# Patient Record
Sex: FEMALE | Race: WHITE | NOT HISPANIC OR LATINO | URBAN - METROPOLITAN AREA
[De-identification: names, ages, dates, MRNs, and addresses within clinical notes are randomized per-mention and may not be internally consistent; named-entity substitution may affect disease eponyms.]

---

## 2020-03-25 ENCOUNTER — APPOINTMENT (OUTPATIENT)
Dept: GENERAL RADIOLOGY | Facility: HOSPITAL | Age: 45
End: 2020-03-25

## 2020-03-25 ENCOUNTER — HOSPITAL ENCOUNTER (INPATIENT)
Facility: HOSPITAL | Age: 45
LOS: 2 days | Discharge: HOME | End: 2020-03-27
Attending: EMERGENCY MEDICINE | Admitting: INTERNAL MEDICINE

## 2020-03-25 DIAGNOSIS — R07.2 CHEST PAIN, PRECORDIAL: ICD-10-CM

## 2020-03-25 DIAGNOSIS — I21.3 ST ELEVATION MYOCARDIAL INFARCTION (STEMI), UNSPECIFIED ARTERY (HCC): Primary | ICD-10-CM

## 2020-03-25 LAB
BASE EXCESS BLDA CALC-SCNC: 0 MMOL/L (ref -5–5)
BASOPHILS # BLD AUTO: 0.03 10*3/MM3 (ref 0–0.2)
BASOPHILS NFR BLD AUTO: 0.4 % (ref 0–1.5)
CA-I BLDA-SCNC: 1.2 MMOL/L (ref 1.2–1.32)
CHOLEST SERPL-MCNC: 233 MG/DL (ref 0–200)
CO2 BLDA-SCNC: 26 MMOL/L (ref 24–29)
DEPRECATED RDW RBC AUTO: 42.8 FL (ref 37–54)
EOSINOPHIL # BLD AUTO: 0.09 10*3/MM3 (ref 0–0.4)
EOSINOPHIL NFR BLD AUTO: 1.3 % (ref 0.3–6.2)
ERYTHROCYTE [DISTWIDTH] IN BLOOD BY AUTOMATED COUNT: 12.7 % (ref 12.3–15.4)
HBA1C MFR BLD: 5.5 % (ref 4.8–5.6)
HCO3 BLDA-SCNC: 24.4 MMOL/L (ref 22–26)
HCT VFR BLD AUTO: 37.9 % (ref 34–46.6)
HCT VFR BLDA CALC: 35 % (ref 38–51)
HDLC SERPL-MCNC: 71 MG/DL (ref 40–60)
HGB BLD-MCNC: 12.2 G/DL (ref 12–15.9)
HGB BLDA-MCNC: 11.9 G/DL (ref 12–17)
HOLD SPECIMEN: NORMAL
HOLD SPECIMEN: NORMAL
IMM GRANULOCYTES # BLD AUTO: 0.02 10*3/MM3 (ref 0–0.05)
IMM GRANULOCYTES NFR BLD AUTO: 0.3 % (ref 0–0.5)
INR PPP: 0.99 (ref 0.85–1.16)
LDLC SERPL CALC-MCNC: 146 MG/DL (ref 0–100)
LDLC/HDLC SERPL: 2.05 {RATIO}
LYMPHOCYTES # BLD AUTO: 2.43 10*3/MM3 (ref 0.7–3.1)
LYMPHOCYTES NFR BLD AUTO: 33.8 % (ref 19.6–45.3)
MAGNESIUM SERPL-MCNC: 1.9 MG/DL (ref 1.6–2.6)
MAGNESIUM SERPL-MCNC: 1.9 MG/DL (ref 1.6–2.6)
MCH RBC QN AUTO: 29.8 PG (ref 26.6–33)
MCHC RBC AUTO-ENTMCNC: 32.2 G/DL (ref 31.5–35.7)
MCV RBC AUTO: 92.4 FL (ref 79–97)
MONOCYTES # BLD AUTO: 0.54 10*3/MM3 (ref 0.1–0.9)
MONOCYTES NFR BLD AUTO: 7.5 % (ref 5–12)
NEUTROPHILS # BLD AUTO: 4.09 10*3/MM3 (ref 1.7–7)
NEUTROPHILS NFR BLD AUTO: 56.7 % (ref 42.7–76)
NRBC BLD AUTO-RTO: 0 /100 WBC (ref 0–0.2)
PCO2 BLDA: 38.9 MM HG (ref 35–45)
PH BLDA: 7.4 PH UNITS (ref 7.35–7.6)
PLATELET # BLD AUTO: 264 10*3/MM3 (ref 140–450)
PMV BLD AUTO: 9.3 FL (ref 6–12)
PO2 BLDA: 25 MMHG (ref 80–105)
POTASSIUM BLDA-SCNC: 3.4 MMOL/L (ref 3.5–4.9)
PROTHROMBIN TIME: 12.8 SECONDS (ref 11.5–14)
RBC # BLD AUTO: 4.1 10*6/MM3 (ref 3.77–5.28)
SAO2 % BLDA: 45 % (ref 95–98)
SODIUM BLDA-SCNC: 137 MMOL/L (ref 138–146)
TRIGL SERPL-MCNC: 81 MG/DL (ref 0–150)
TROPONIN T SERPL-MCNC: 0.63 NG/ML (ref 0–0.03)
TROPONIN T SERPL-MCNC: <0.01 NG/ML (ref 0–0.03)
VLDLC SERPL-MCNC: 16.2 MG/DL
WBC NRBC COR # BLD: 7.2 10*3/MM3 (ref 3.4–10.8)
WHOLE BLOOD HOLD SPECIMEN: NORMAL
WHOLE BLOOD HOLD SPECIMEN: NORMAL

## 2020-03-25 PROCEDURE — 84295 ASSAY OF SERUM SODIUM: CPT

## 2020-03-25 PROCEDURE — 84484 ASSAY OF TROPONIN QUANT: CPT | Performed by: INTERNAL MEDICINE

## 2020-03-25 PROCEDURE — 85610 PROTHROMBIN TIME: CPT | Performed by: EMERGENCY MEDICINE

## 2020-03-25 PROCEDURE — 85025 COMPLETE CBC W/AUTO DIFF WBC: CPT | Performed by: EMERGENCY MEDICINE

## 2020-03-25 PROCEDURE — 4A023N7 MEASUREMENT OF CARDIAC SAMPLING AND PRESSURE, LEFT HEART, PERCUTANEOUS APPROACH: ICD-10-PCS | Performed by: INTERNAL MEDICINE

## 2020-03-25 PROCEDURE — 82330 ASSAY OF CALCIUM: CPT

## 2020-03-25 PROCEDURE — 93458 L HRT ARTERY/VENTRICLE ANGIO: CPT | Performed by: INTERNAL MEDICINE

## 2020-03-25 PROCEDURE — 99024 POSTOP FOLLOW-UP VISIT: CPT | Performed by: INTERNAL MEDICINE

## 2020-03-25 PROCEDURE — 25010000002 HEPARIN (PORCINE) PER 1000 UNITS: Performed by: EMERGENCY MEDICINE

## 2020-03-25 PROCEDURE — B2111ZZ FLUOROSCOPY OF MULTIPLE CORONARY ARTERIES USING LOW OSMOLAR CONTRAST: ICD-10-PCS | Performed by: INTERNAL MEDICINE

## 2020-03-25 PROCEDURE — 0 IOPAMIDOL PER 1 ML: Performed by: INTERNAL MEDICINE

## 2020-03-25 PROCEDURE — 25010000002 EPTIFIBATIDE PER 5 MG: Performed by: INTERNAL MEDICINE

## 2020-03-25 PROCEDURE — 80061 LIPID PANEL: CPT | Performed by: INTERNAL MEDICINE

## 2020-03-25 PROCEDURE — C1894 INTRO/SHEATH, NON-LASER: HCPCS | Performed by: INTERNAL MEDICINE

## 2020-03-25 PROCEDURE — 82947 ASSAY GLUCOSE BLOOD QUANT: CPT

## 2020-03-25 PROCEDURE — 85014 HEMATOCRIT: CPT

## 2020-03-25 PROCEDURE — 83735 ASSAY OF MAGNESIUM: CPT | Performed by: INTERNAL MEDICINE

## 2020-03-25 PROCEDURE — 93005 ELECTROCARDIOGRAM TRACING: CPT | Performed by: EMERGENCY MEDICINE

## 2020-03-25 PROCEDURE — 84484 ASSAY OF TROPONIN QUANT: CPT | Performed by: EMERGENCY MEDICINE

## 2020-03-25 PROCEDURE — 25010000003 LIDOCAINE 1 % SOLUTION: Performed by: INTERNAL MEDICINE

## 2020-03-25 PROCEDURE — 84132 ASSAY OF SERUM POTASSIUM: CPT

## 2020-03-25 PROCEDURE — 82803 BLOOD GASES ANY COMBINATION: CPT

## 2020-03-25 PROCEDURE — 83735 ASSAY OF MAGNESIUM: CPT | Performed by: EMERGENCY MEDICINE

## 2020-03-25 PROCEDURE — C1887 CATHETER, GUIDING: HCPCS | Performed by: INTERNAL MEDICINE

## 2020-03-25 PROCEDURE — 71045 X-RAY EXAM CHEST 1 VIEW: CPT

## 2020-03-25 PROCEDURE — 99283 EMERGENCY DEPT VISIT LOW MDM: CPT

## 2020-03-25 PROCEDURE — B2151ZZ FLUOROSCOPY OF LEFT HEART USING LOW OSMOLAR CONTRAST: ICD-10-PCS | Performed by: INTERNAL MEDICINE

## 2020-03-25 PROCEDURE — 83036 HEMOGLOBIN GLYCOSYLATED A1C: CPT | Performed by: INTERNAL MEDICINE

## 2020-03-25 PROCEDURE — 25010000002 EPTIFIBATIDE 20 MG/10ML SOLUTION: Performed by: INTERNAL MEDICINE

## 2020-03-25 PROCEDURE — 85347 COAGULATION TIME ACTIVATED: CPT

## 2020-03-25 PROCEDURE — C1769 GUIDE WIRE: HCPCS | Performed by: INTERNAL MEDICINE

## 2020-03-25 PROCEDURE — C1760 CLOSURE DEV, VASC: HCPCS | Performed by: INTERNAL MEDICINE

## 2020-03-25 RX ORDER — SODIUM CHLORIDE 9 MG/ML
10 INJECTION INTRAVENOUS AS NEEDED
Status: DISCONTINUED | OUTPATIENT
Start: 2020-03-25 | End: 2020-03-27 | Stop reason: HOSPADM

## 2020-03-25 RX ORDER — CLOPIDOGREL BISULFATE 75 MG/1
TABLET ORAL
Status: COMPLETED | OUTPATIENT
Start: 2020-03-25 | End: 2020-03-25

## 2020-03-25 RX ORDER — NICARDIPINE HCL-0.9% SOD CHLOR 1 MG/10 ML
SYRINGE (ML) INTRAVENOUS AS NEEDED
Status: DISCONTINUED | OUTPATIENT
Start: 2020-03-25 | End: 2020-03-25 | Stop reason: HOSPADM

## 2020-03-25 RX ORDER — ALUMINA, MAGNESIA, AND SIMETHICONE 2400; 2400; 240 MG/30ML; MG/30ML; MG/30ML
15 SUSPENSION ORAL EVERY 6 HOURS PRN
Status: DISCONTINUED | OUTPATIENT
Start: 2020-03-25 | End: 2020-03-27 | Stop reason: HOSPADM

## 2020-03-25 RX ORDER — SODIUM CHLORIDE 9 MG/ML
100 INJECTION, SOLUTION INTRAVENOUS CONTINUOUS
Status: ACTIVE | OUTPATIENT
Start: 2020-03-25 | End: 2020-03-26

## 2020-03-25 RX ORDER — NITROGLYCERIN 10 MG/100ML
INJECTION INTRAVENOUS CONTINUOUS PRN
Status: COMPLETED | OUTPATIENT
Start: 2020-03-25 | End: 2020-03-25

## 2020-03-25 RX ORDER — BUSPIRONE HYDROCHLORIDE 10 MG/1
30 TABLET ORAL EVERY 12 HOURS SCHEDULED
Status: DISCONTINUED | OUTPATIENT
Start: 2020-03-25 | End: 2020-03-27 | Stop reason: HOSPADM

## 2020-03-25 RX ORDER — ASPIRIN 81 MG/1
81 TABLET, CHEWABLE ORAL DAILY
Status: DISCONTINUED | OUTPATIENT
Start: 2020-03-26 | End: 2020-03-27 | Stop reason: HOSPADM

## 2020-03-25 RX ORDER — LISINOPRIL 10 MG/1
10 TABLET ORAL DAILY
Status: DISCONTINUED | OUTPATIENT
Start: 2020-03-26 | End: 2020-03-27 | Stop reason: HOSPADM

## 2020-03-25 RX ORDER — TRAZODONE HYDROCHLORIDE 50 MG/1
50 TABLET ORAL NIGHTLY
Status: DISCONTINUED | OUTPATIENT
Start: 2020-03-25 | End: 2020-03-27 | Stop reason: HOSPADM

## 2020-03-25 RX ORDER — ONDANSETRON 4 MG/1
4 TABLET, FILM COATED ORAL EVERY 6 HOURS PRN
Status: DISCONTINUED | OUTPATIENT
Start: 2020-03-25 | End: 2020-03-27 | Stop reason: HOSPADM

## 2020-03-25 RX ORDER — CLOPIDOGREL BISULFATE 75 MG/1
75 TABLET ORAL DAILY
Status: DISCONTINUED | OUTPATIENT
Start: 2020-03-26 | End: 2020-03-27 | Stop reason: HOSPADM

## 2020-03-25 RX ORDER — ACETAMINOPHEN 325 MG/1
650 TABLET ORAL EVERY 4 HOURS PRN
Status: DISCONTINUED | OUTPATIENT
Start: 2020-03-25 | End: 2020-03-27 | Stop reason: HOSPADM

## 2020-03-25 RX ORDER — NITROGLYCERIN 20 MG/100ML
INJECTION INTRAVENOUS
Status: COMPLETED
Start: 2020-03-25 | End: 2020-03-25

## 2020-03-25 RX ORDER — PANTOPRAZOLE SODIUM 40 MG/1
40 TABLET, DELAYED RELEASE ORAL NIGHTLY
Status: DISCONTINUED | OUTPATIENT
Start: 2020-03-25 | End: 2020-03-27 | Stop reason: HOSPADM

## 2020-03-25 RX ORDER — SERTRALINE HYDROCHLORIDE 100 MG/1
200 TABLET, FILM COATED ORAL NIGHTLY
Status: DISCONTINUED | OUTPATIENT
Start: 2020-03-25 | End: 2020-03-27 | Stop reason: HOSPADM

## 2020-03-25 RX ORDER — EPTIFIBATIDE 0.75 MG/ML
2 INJECTION, SOLUTION INTRAVENOUS ONCE
Status: COMPLETED | OUTPATIENT
Start: 2020-03-25 | End: 2020-03-26

## 2020-03-25 RX ORDER — LIDOCAINE HYDROCHLORIDE 10 MG/ML
INJECTION, SOLUTION INFILTRATION; PERINEURAL AS NEEDED
Status: DISCONTINUED | OUTPATIENT
Start: 2020-03-25 | End: 2020-03-25 | Stop reason: HOSPADM

## 2020-03-25 RX ORDER — ONDANSETRON 2 MG/ML
4 INJECTION INTRAMUSCULAR; INTRAVENOUS EVERY 6 HOURS PRN
Status: DISCONTINUED | OUTPATIENT
Start: 2020-03-25 | End: 2020-03-27 | Stop reason: HOSPADM

## 2020-03-25 RX ORDER — HEPARIN SODIUM 5000 [USP'U]/ML
INJECTION, SOLUTION INTRAVENOUS; SUBCUTANEOUS
Status: COMPLETED | OUTPATIENT
Start: 2020-03-25 | End: 2020-03-25

## 2020-03-25 RX ORDER — ATORVASTATIN CALCIUM 40 MG/1
40 TABLET, FILM COATED ORAL NIGHTLY
Status: DISCONTINUED | OUTPATIENT
Start: 2020-03-25 | End: 2020-03-27 | Stop reason: HOSPADM

## 2020-03-25 RX ORDER — EPTIFIBATIDE 0.75 MG/ML
INJECTION, SOLUTION INTRAVENOUS CONTINUOUS PRN
Status: COMPLETED | OUTPATIENT
Start: 2020-03-25 | End: 2020-03-25

## 2020-03-25 RX ORDER — EPTIFIBATIDE 2 MG/ML
INJECTION, SOLUTION INTRAVENOUS AS NEEDED
Status: DISCONTINUED | OUTPATIENT
Start: 2020-03-25 | End: 2020-03-25 | Stop reason: HOSPADM

## 2020-03-25 RX ORDER — NITROGLYCERIN 20 MG/100ML
5-200 INJECTION INTRAVENOUS
Status: DISCONTINUED | OUTPATIENT
Start: 2020-03-25 | End: 2020-03-27

## 2020-03-25 RX ADMIN — NITROGLYCERIN 20 MCG/MIN: 20 INJECTION INTRAVENOUS at 20:06

## 2020-03-25 RX ADMIN — NITROGLYCERIN 10 MCG/MIN: 20 INJECTION INTRAVENOUS at 22:49

## 2020-03-25 RX ADMIN — SERTRALINE HYDROCHLORIDE 200 MG: 100 TABLET ORAL at 22:49

## 2020-03-25 RX ADMIN — HEPARIN SODIUM 5552.04 UNITS: 5000 INJECTION, SOLUTION INTRAVENOUS; SUBCUTANEOUS at 20:02

## 2020-03-25 RX ADMIN — BUSPIRONE HYDROCHLORIDE 30 MG: 10 TABLET ORAL at 22:49

## 2020-03-25 RX ADMIN — SODIUM CHLORIDE 100 ML/HR: 9 INJECTION, SOLUTION INTRAVENOUS at 22:49

## 2020-03-25 RX ADMIN — PANTOPRAZOLE SODIUM 40 MG: 40 TABLET, DELAYED RELEASE ORAL at 22:49

## 2020-03-25 RX ADMIN — METOPROLOL TARTRATE 25 MG: 25 TABLET, FILM COATED ORAL at 22:49

## 2020-03-25 RX ADMIN — ATORVASTATIN CALCIUM 40 MG: 40 TABLET, FILM COATED ORAL at 22:49

## 2020-03-25 RX ADMIN — TRAZODONE HYDROCHLORIDE 50 MG: 50 TABLET ORAL at 22:49

## 2020-03-25 RX ADMIN — CLOPIDOGREL BISULFATE 600 MG: 75 TABLET ORAL at 20:02

## 2020-03-25 RX ADMIN — EPTIFIBATIDE 2 MCG/KG/MIN: 75 INJECTION INTRAVENOUS at 22:49

## 2020-03-25 NOTE — ED PROVIDER NOTES
Subjective   Karen Wagner is a 45 y.o.female who presents to the ED with complaints of right sided chest pain. The patient reports her pain has been ongoing since sudden onset at approximately 1800 this evening. She describes her pain as a burning, sharp pain. She has been given Aspirin and Nitroglycerin while en route to the emergency department. She also complains of shortness of breath. She has also had a mild cough that she attributes to allergies. Additionally, she has a history of asthma and hypertension. She does have significant family history of coronary artery disease. There are no other complaints at this time.       History provided by:  Patient and EMS personnel  Chest Pain   Pain location:  R chest  Pain quality: burning and sharp    Pain radiates to:  Does not radiate  Pain severity:  Moderate  Onset quality:  Sudden  Duration:  2 hours  Timing:  Constant  Progression:  Unchanged  Chronicity:  New  Relieved by:  Nitroglycerin and aspirin  Worsened by:  Nothing  Associated symptoms: cough and shortness of breath    Risk factors: hypertension    Risk factors: no diabetes mellitus, no high cholesterol, not male and no smoking        Review of Systems   Respiratory: Positive for cough and shortness of breath.    Cardiovascular: Positive for chest pain.   All other systems reviewed and are negative.      No past medical history on file.    No Known Allergies    No past surgical history on file.    No family history on file.           Objective   Physical Exam   Constitutional: She is oriented to person, place, and time. She appears well-developed and well-nourished.   HENT:   Head: Normocephalic and atraumatic.   Eyes: Conjunctivae are normal. No scleral icterus.   Neck: Normal range of motion. Neck supple.   Cardiovascular: Normal rate, regular rhythm, normal heart sounds and intact distal pulses.   No murmur heard.  Pulmonary/Chest: Effort normal and breath sounds normal. No respiratory  distress.   Abdominal: Soft. Bowel sounds are normal. There is no tenderness.   Musculoskeletal: Normal range of motion. She exhibits no edema.   Neurological: She is alert and oriented to person, place, and time.   Skin: Skin is warm and dry.   Psychiatric: Her behavior is normal. Her mood appears anxious.   Nursing note and vitals reviewed.      Critical Care  Performed by: Bud Maya MD  Authorized by: Bud Maya MD     Critical care provider statement:     Critical care time (minutes):  30    Critical care time was exclusive of:  Separately billable procedures and treating other patients    Critical care was necessary to treat or prevent imminent or life-threatening deterioration of the following conditions:  Cardiac failure    Critical care was time spent personally by me on the following activities:  Ordering and performing treatments and interventions, ordering and review of laboratory studies, ordering and review of radiographic studies, pulse oximetry, re-evaluation of patient's condition, review of old charts, development of treatment plan with patient or surrogate, discussions with consultants, evaluation of patient's response to treatment, examination of patient and obtaining history from patient or surrogate    I assumed direction of critical care for this patient from another provider in my specialty: no               ED Course  ED Course as of Mar 25 2036   Wed Mar 25, 2020   1958 Dr. Maya is reviewing the EKG ran by EMS personnel while en route to the emergency department.     [TB]   2010 I have rechecked the patient.  She is still having moderate chest discomfort.  I have increased the nitroglycerin up to 30 mics per minute now.  I spoke with Dr. Rand, cardiology interventionalists.  He is on his way.  I spoke with the patient about going to the Cath Lab and she is agreeable.    [MS]   2035 COVID-19 RISK SCREEN    Has the patient been exposed to a known COVID-19 (+) person or a  person under investigation (PUI) for COVID-19 infection?  -- No     Has the patient traveled to or are they from a Level III endemic country? --  No    Has the patient traveled to or are they from a local community endemic area?   --  Yes    Does the patient have baseline higher exposure risk such as working in healthcare field or currently residing in healthcare facility?  --  No    Helpful websites (please copy and paste in an internet browser):       https://www.cdc.gov/coronavirus/2019-ncov/travelers/map-and-travel-notices.html#travel-1       https://eSeekers/yzpbnxz03       https://www.[place].gov/coronavirus/         [TB]      ED Course User Index  [MS] Bud Maya MD  [TB] Damian Sharma     Recent Results (from the past 24 hour(s))   Troponin    Collection Time: 03/25/20  8:01 PM   Result Value Ref Range    Troponin T <0.010 0.000 - 0.030 ng/mL   Magnesium    Collection Time: 03/25/20  8:01 PM   Result Value Ref Range    Magnesium 1.9 1.6 - 2.6 mg/dL   Protime-INR    Collection Time: 03/25/20  8:01 PM   Result Value Ref Range    Protime 12.8 11.5 - 14.0 Seconds    INR 0.99 0.85 - 1.16   CBC Auto Differential    Collection Time: 03/25/20  8:01 PM   Result Value Ref Range    WBC 7.20 3.40 - 10.80 10*3/mm3    RBC 4.10 3.77 - 5.28 10*6/mm3    Hemoglobin 12.2 12.0 - 15.9 g/dL    Hematocrit 37.9 34.0 - 46.6 %    MCV 92.4 79.0 - 97.0 fL    MCH 29.8 26.6 - 33.0 pg    MCHC 32.2 31.5 - 35.7 g/dL    RDW 12.7 12.3 - 15.4 %    RDW-SD 42.8 37.0 - 54.0 fl    MPV 9.3 6.0 - 12.0 fL    Platelets 264 140 - 450 10*3/mm3    Neutrophil % 56.7 42.7 - 76.0 %    Lymphocyte % 33.8 19.6 - 45.3 %    Monocyte % 7.5 5.0 - 12.0 %    Eosinophil % 1.3 0.3 - 6.2 %    Basophil % 0.4 0.0 - 1.5 %    Immature Grans % 0.3 0.0 - 0.5 %    Neutrophils, Absolute 4.09 1.70 - 7.00 10*3/mm3    Lymphocytes, Absolute 2.43 0.70 - 3.10 10*3/mm3    Monocytes, Absolute 0.54 0.10 - 0.90 10*3/mm3    Eosinophils, Absolute 0.09 0.00 - 0.40 10*3/mm3     "Basophils, Absolute 0.03 0.00 - 0.20 10*3/mm3    Immature Grans, Absolute 0.02 0.00 - 0.05 10*3/mm3    nRBC 0.0 0.0 - 0.2 /100 WBC   POC Surgery Labs    Collection Time: 03/25/20  8:08 PM   Result Value Ref Range    Ionized Calcium 1.20 1.20 - 1.32 mmol/L    POC Potassium 3.4 (L) 3.5 - 4.9 mmol/L    Sodium 137 (L) 138 - 146 mmol/L    Total CO2 26 24 - 29 mmol/L    Hemoglobin 11.9 (L) 12.0 - 17.0 g/dL    Hematocrit 35 (L) 38 - 51 %    pCO2, Arterial 38.9 35 - 45 mm Hg    pO2, Arterial 25 (L) 80 - 105 mmHg    Base Excess 0.0000 -5 - 5 mmol/L    O2 Saturation, Arterial 45 (L) 95 - 98 %    pH, Arterial 7.40 7.35 - 7.6 pH units    HCO3, Arterial 24.4 22 - 26 mmol/L     Note: In addition to lab results from this visit, the labs listed above may include labs taken at another facility or during a different encounter within the last 24 hours. Please correlate lab times with ED admission and discharge times for further clarification of the services performed during this visit.    XR Chest 1 View   Final Result   No acute cardiopulmonary findings.      Signer Name: Kenton Ortiz MD    Signed: 3/25/2020 8:21 PM    Workstation Name: [id number]     [facility]        Vitals:    03/25/20 1959 03/25/20 2028   BP: (!) 149/101 146/90   BP Location: Right arm    Patient Position: Sitting    Pulse: 99 102   Resp: 18 20   Temp: 98.3 °F (36.8 °C)    TempSrc: Oral    SpO2: 98% 98%   Weight: 92.5 kg (204 lb)    Height: 172.7 cm (68\")      Medications   Sodium Chloride (PF) 0.9 % 10 mL ( Intravenous MAR Hold 3/25/20 2021)   nitroglycerin 50 mg/250 mL (0.2 mg/mL) infusion (30 mcg/min Intravenous Rate/Dose Change 3/25/20 2009)   lidocaine (XYLOCAINE) 1 % injection (5 mL Infiltration Given 3/25/20 2029)   nitroglycerin 100 mcg/mL in D5W syringe (200 mcg Intra-coronary Given 3/25/20 2035)   niCARdipine HCl in NaCl (CARDENE) 1-0.9 MG/10ML-% syringe solution prefilled syringe (200 mcg Intra-coronary Given 3/25/20 2036) "   clopidogrel (PLAVIX) tablet (600 mg Oral Given 3/25/20 2002)   heparin (porcine) 5000 UNIT/ML injection (5,552.04 Units Intravenous Given 3/25/20 2002)     ECG/EMG Results (last 24 hours)     ** No results found for the last 24 hours. **        ECG 12 Lead   Preliminary Result   Test Reason : stemi   Blood Pressure : **/** mmHG   Vent. Rate : 075 BPM     Atrial Rate : 075 BPM      P-R Int : 160 ms          QRS Dur : 080 ms       QT Int : 422 ms       P-R-T Axes : 074 039 -06 degrees      QTc Int : 471 ms      Normal sinus rhythm   Low voltage QRS   Cannot rule out Anterior infarct (cited on or before 25-MAR-2020)   Abnormal ECG   When compared with ECG of 25-MAR-2020 20:00, (Unconfirmed)   Serial changes of evolving Anterior infarct present      Referred By:             Confirmed By:       ECG 12 Lead                                                    MDM  Number of Diagnoses or Management Options  Chest pain, precordial:   ST elevation myocardial infarction (STEMI), unspecified artery (CMS/HCC):   Critical Care  Total time providing critical care: 30-74 minutes      Final diagnoses:   ST elevation myocardial infarction (STEMI), unspecified artery (CMS/HCC)   Chest pain, precordial       Documentation assistance provided by alverto Sharma.  Information recorded by the alverto was done at my direction and has been verified and validated by me.     Damian Sharma  03/25/20 2036       Bud Maya MD  03/27/20 0431

## 2020-03-26 ENCOUNTER — APPOINTMENT (OUTPATIENT)
Dept: CARDIOLOGY | Facility: HOSPITAL | Age: 45
End: 2020-03-26

## 2020-03-26 PROBLEM — I25.42 SPONTANEOUS DISSECTION OF CORONARY ARTERY: Status: ACTIVE | Noted: 2020-03-26

## 2020-03-26 PROBLEM — E78.2 MIXED HYPERLIPIDEMIA: Status: ACTIVE | Noted: 2020-03-26

## 2020-03-26 PROBLEM — I10 ESSENTIAL HYPERTENSION: Status: ACTIVE | Noted: 2020-03-26

## 2020-03-26 PROBLEM — K21.9 GERD (GASTROESOPHAGEAL REFLUX DISEASE): Status: ACTIVE | Noted: 2020-03-26

## 2020-03-26 PROBLEM — I25.10 CAD (CORONARY ARTERY DISEASE): Status: ACTIVE | Noted: 2020-03-26

## 2020-03-26 LAB
ACT BLD: 197 SECONDS (ref 82–152)
ANION GAP SERPL CALCULATED.3IONS-SCNC: 11 MMOL/L (ref 5–15)
BH CV ECHO MEAS - AO MAX PG (FULL): 10.1 MMHG
BH CV ECHO MEAS - AO MAX PG: 12.7 MMHG
BH CV ECHO MEAS - AO MEAN PG (FULL): 4 MMHG
BH CV ECHO MEAS - AO MEAN PG: 5 MMHG
BH CV ECHO MEAS - AO ROOT AREA (BSA CORRECTED): 1.6
BH CV ECHO MEAS - AO ROOT AREA: 9.1 CM^2
BH CV ECHO MEAS - AO ROOT DIAM: 3.4 CM
BH CV ECHO MEAS - AO V2 MAX: 178 CM/SEC
BH CV ECHO MEAS - AO V2 MEAN: 99.2 CM/SEC
BH CV ECHO MEAS - AO V2 VTI: 27.8 CM
BH CV ECHO MEAS - ASC AORTA: 3.2 CM
BH CV ECHO MEAS - AVA(I,A): 1.9 CM^2
BH CV ECHO MEAS - AVA(I,D): 1.9 CM^2
BH CV ECHO MEAS - AVA(V,A): 1.3 CM^2
BH CV ECHO MEAS - AVA(V,D): 1.3 CM^2
BH CV ECHO MEAS - BSA(HAYCOCK): 2.1 M^2
BH CV ECHO MEAS - BSA: 2.1 M^2
BH CV ECHO MEAS - BZI_BMI: 31 KILOGRAMS/M^2
BH CV ECHO MEAS - BZI_METRIC_HEIGHT: 172.7 CM
BH CV ECHO MEAS - BZI_METRIC_WEIGHT: 92.5 KG
BH CV ECHO MEAS - EDV(CUBED): 95.4 ML
BH CV ECHO MEAS - EDV(MOD-SP2): 85 ML
BH CV ECHO MEAS - EDV(MOD-SP4): 91 ML
BH CV ECHO MEAS - EDV(TEICH): 95.9 ML
BH CV ECHO MEAS - EF(CUBED): 72 %
BH CV ECHO MEAS - EF(MOD-BP): 62 %
BH CV ECHO MEAS - EF(MOD-SP2): 61.2 %
BH CV ECHO MEAS - EF(MOD-SP4): 59.3 %
BH CV ECHO MEAS - EF(TEICH): 63.8 %
BH CV ECHO MEAS - ESV(CUBED): 26.7 ML
BH CV ECHO MEAS - ESV(MOD-SP2): 33 ML
BH CV ECHO MEAS - ESV(MOD-SP4): 37 ML
BH CV ECHO MEAS - ESV(TEICH): 34.7 ML
BH CV ECHO MEAS - FS: 34.6 %
BH CV ECHO MEAS - IVS/LVPW: 0.88
BH CV ECHO MEAS - IVSD: 0.73 CM
BH CV ECHO MEAS - LA DIMENSION: 2.6 CM
BH CV ECHO MEAS - LA/AO: 0.76
BH CV ECHO MEAS - LAD MAJOR: 5.8 CM
BH CV ECHO MEAS - LAT PEAK E' VEL: 13.5 CM/SEC
BH CV ECHO MEAS - LATERAL E/E' RATIO: 6.1
BH CV ECHO MEAS - LV DIASTOLIC VOL/BSA (35-75): 44.1 ML/M^2
BH CV ECHO MEAS - LV MASS(C)D: 112 GRAMS
BH CV ECHO MEAS - LV MASS(C)DI: 54.3 GRAMS/M^2
BH CV ECHO MEAS - LV MAX PG: 2.5 MMHG
BH CV ECHO MEAS - LV MEAN PG: 1 MMHG
BH CV ECHO MEAS - LV SYSTOLIC VOL/BSA (12-30): 18 ML/M^2
BH CV ECHO MEAS - LV V1 MAX: 79.7 CM/SEC
BH CV ECHO MEAS - LV V1 MEAN: 51.8 CM/SEC
BH CV ECHO MEAS - LV V1 VTI: 18.2 CM
BH CV ECHO MEAS - LVIDD: 4.6 CM
BH CV ECHO MEAS - LVIDS: 3 CM
BH CV ECHO MEAS - LVLD AP2: 7.4 CM
BH CV ECHO MEAS - LVLD AP4: 6.8 CM
BH CV ECHO MEAS - LVLS AP2: 6.1 CM
BH CV ECHO MEAS - LVLS AP4: 6.1 CM
BH CV ECHO MEAS - LVOT AREA (M): 2.8 CM^2
BH CV ECHO MEAS - LVOT AREA: 2.8 CM^2
BH CV ECHO MEAS - LVOT DIAM: 1.9 CM
BH CV ECHO MEAS - LVPWD: 0.83 CM
BH CV ECHO MEAS - MED PEAK E' VEL: 10.1 CM/SEC
BH CV ECHO MEAS - MEDIAL E/E' RATIO: 8.2
BH CV ECHO MEAS - MV A MAX VEL: 94.8 CM/SEC
BH CV ECHO MEAS - MV DEC TIME: 0.25 SEC
BH CV ECHO MEAS - MV E MAX VEL: 82.9 CM/SEC
BH CV ECHO MEAS - MV E/A: 0.87
BH CV ECHO MEAS - PA ACC SLOPE: 500 CM/SEC^2
BH CV ECHO MEAS - PA ACC TIME: 0.16 SEC
BH CV ECHO MEAS - PA PR(ACCEL): 9.3 MMHG
BH CV ECHO MEAS - RAP SYSTOLE: 3 MMHG
BH CV ECHO MEAS - RVDD: 3.6 CM
BH CV ECHO MEAS - RVSP: 19 MMHG
BH CV ECHO MEAS - SI(AO): 122.5 ML/M^2
BH CV ECHO MEAS - SI(CUBED): 33.3 ML/M^2
BH CV ECHO MEAS - SI(LVOT): 25 ML/M^2
BH CV ECHO MEAS - SI(MOD-SP2): 25.2 ML/M^2
BH CV ECHO MEAS - SI(MOD-SP4): 26.2 ML/M^2
BH CV ECHO MEAS - SI(TEICH): 29.7 ML/M^2
BH CV ECHO MEAS - SV(AO): 252.4 ML
BH CV ECHO MEAS - SV(CUBED): 68.7 ML
BH CV ECHO MEAS - SV(LVOT): 51.6 ML
BH CV ECHO MEAS - SV(MOD-SP2): 52 ML
BH CV ECHO MEAS - SV(MOD-SP4): 54 ML
BH CV ECHO MEAS - SV(TEICH): 61.1 ML
BH CV ECHO MEAS - TR MAX PG: 16 MMHG
BH CV ECHO MEAS - TR MAX VEL: 197 CM/SEC
BH CV ECHO MEASUREMENTS AVERAGE E/E' RATIO: 7.03
BH CV VAS BP LEFT ARM: NORMAL MMHG
BH CV XLRA - RV BASE: 3.2 CM
BH CV XLRA - RV LENGTH: 2.7 CM
BH CV XLRA - TDI S': 5.3 CM/SEC
BUN BLD-MCNC: 11 MG/DL (ref 6–20)
BUN/CREAT SERPL: 18.6 (ref 7–25)
CALCIUM SPEC-SCNC: 8.1 MG/DL (ref 8.6–10.5)
CHLORIDE SERPL-SCNC: 107 MMOL/L (ref 98–107)
CO2 SERPL-SCNC: 21 MMOL/L (ref 22–29)
CREAT BLD-MCNC: 0.59 MG/DL (ref 0.57–1)
DEPRECATED RDW RBC AUTO: 43.8 FL (ref 37–54)
ERYTHROCYTE [DISTWIDTH] IN BLOOD BY AUTOMATED COUNT: 12.8 % (ref 12.3–15.4)
GFR SERPL CREATININE-BSD FRML MDRD: 110 ML/MIN/1.73
GLUCOSE BLD-MCNC: 112 MG/DL (ref 65–99)
HCT VFR BLD AUTO: 34.3 % (ref 34–46.6)
HGB BLD-MCNC: 10.8 G/DL (ref 12–15.9)
LEFT ATRIUM VOLUME INDEX: 31.5 ML/M^2
LEFT ATRIUM VOLUME: 65 ML
LV EF 2D ECHO EST: 60 %
MCH RBC QN AUTO: 29.3 PG (ref 26.6–33)
MCHC RBC AUTO-ENTMCNC: 31.5 G/DL (ref 31.5–35.7)
MCV RBC AUTO: 93 FL (ref 79–97)
PLATELET # BLD AUTO: 230 10*3/MM3 (ref 140–450)
PMV BLD AUTO: 9.6 FL (ref 6–12)
POTASSIUM BLD-SCNC: 3.8 MMOL/L (ref 3.5–5.2)
RBC # BLD AUTO: 3.69 10*6/MM3 (ref 3.77–5.28)
SODIUM BLD-SCNC: 139 MMOL/L (ref 136–145)
TROPONIN T SERPL-MCNC: 1.5 NG/ML (ref 0–0.03)
WBC NRBC COR # BLD: 6.97 10*3/MM3 (ref 3.4–10.8)

## 2020-03-26 PROCEDURE — 93010 ELECTROCARDIOGRAM REPORT: CPT | Performed by: INTERNAL MEDICINE

## 2020-03-26 PROCEDURE — 93306 TTE W/DOPPLER COMPLETE: CPT | Performed by: INTERNAL MEDICINE

## 2020-03-26 PROCEDURE — 85027 COMPLETE CBC AUTOMATED: CPT | Performed by: INTERNAL MEDICINE

## 2020-03-26 PROCEDURE — 93005 ELECTROCARDIOGRAM TRACING: CPT | Performed by: INTERNAL MEDICINE

## 2020-03-26 PROCEDURE — 84484 ASSAY OF TROPONIN QUANT: CPT | Performed by: INTERNAL MEDICINE

## 2020-03-26 PROCEDURE — 99232 SBSQ HOSP IP/OBS MODERATE 35: CPT | Performed by: INTERNAL MEDICINE

## 2020-03-26 PROCEDURE — 80048 BASIC METABOLIC PNL TOTAL CA: CPT | Performed by: INTERNAL MEDICINE

## 2020-03-26 PROCEDURE — 93306 TTE W/DOPPLER COMPLETE: CPT

## 2020-03-26 RX ORDER — FLUTICASONE PROPIONATE 50 MCG
2 SPRAY, SUSPENSION (ML) NASAL DAILY
COMMUNITY

## 2020-03-26 RX ORDER — PANTOPRAZOLE SODIUM 40 MG/1
40 TABLET, DELAYED RELEASE ORAL DAILY
COMMUNITY

## 2020-03-26 RX ORDER — METOPROLOL TARTRATE 50 MG/1
50 TABLET, FILM COATED ORAL EVERY 12 HOURS SCHEDULED
Status: DISCONTINUED | OUTPATIENT
Start: 2020-03-26 | End: 2020-03-27 | Stop reason: HOSPADM

## 2020-03-26 RX ORDER — BUSPIRONE HYDROCHLORIDE 15 MG/1
15 TABLET ORAL 2 TIMES DAILY PRN
COMMUNITY

## 2020-03-26 RX ORDER — ALBUTEROL SULFATE 90 UG/1
2 AEROSOL, METERED RESPIRATORY (INHALATION) EVERY 4 HOURS PRN
COMMUNITY

## 2020-03-26 RX ORDER — LISINOPRIL 10 MG/1
10 TABLET ORAL DAILY
COMMUNITY
End: 2020-06-12

## 2020-03-26 RX ORDER — IBUPROFEN 800 MG/1
800 TABLET ORAL 3 TIMES DAILY PRN
COMMUNITY
End: 2020-06-12

## 2020-03-26 RX ORDER — CYCLOBENZAPRINE HCL 5 MG
5 TABLET ORAL SEE ADMIN INSTRUCTIONS
COMMUNITY
End: 2020-06-15 | Stop reason: SDUPTHER

## 2020-03-26 RX ORDER — ERGOCALCIFEROL 1.25 MG/1
50000 CAPSULE ORAL WEEKLY
Status: ON HOLD | COMMUNITY
End: 2020-03-26

## 2020-03-26 RX ORDER — MONTELUKAST SODIUM 10 MG/1
10 TABLET ORAL NIGHTLY
COMMUNITY

## 2020-03-26 RX ORDER — TRAZODONE HYDROCHLORIDE 100 MG/1
100 TABLET ORAL NIGHTLY
COMMUNITY

## 2020-03-26 RX ORDER — SERTRALINE HYDROCHLORIDE 100 MG/1
100 TABLET, FILM COATED ORAL 2 TIMES DAILY
COMMUNITY

## 2020-03-26 RX ORDER — LEVOCETIRIZINE DIHYDROCHLORIDE 5 MG/1
10 TABLET, FILM COATED ORAL
COMMUNITY

## 2020-03-26 RX ORDER — CHOLECALCIFEROL (VITAMIN D3) 1250 MCG
1000 CAPSULE ORAL WEEKLY
COMMUNITY

## 2020-03-26 RX ORDER — ACETAMINOPHEN 500 MG
500 TABLET ORAL EVERY 6 HOURS PRN
COMMUNITY

## 2020-03-26 RX ADMIN — ASPIRIN 81 MG 81 MG: 81 TABLET ORAL at 09:50

## 2020-03-26 RX ADMIN — METOPROLOL TARTRATE 50 MG: 50 TABLET, FILM COATED ORAL at 09:49

## 2020-03-26 RX ADMIN — ATORVASTATIN CALCIUM 40 MG: 40 TABLET, FILM COATED ORAL at 20:31

## 2020-03-26 RX ADMIN — TRAZODONE HYDROCHLORIDE 50 MG: 50 TABLET ORAL at 20:31

## 2020-03-26 RX ADMIN — METOPROLOL TARTRATE 50 MG: 50 TABLET, FILM COATED ORAL at 20:32

## 2020-03-26 RX ADMIN — BUSPIRONE HYDROCHLORIDE 30 MG: 10 TABLET ORAL at 20:31

## 2020-03-26 RX ADMIN — ACETAMINOPHEN 650 MG: 325 TABLET, FILM COATED ORAL at 20:37

## 2020-03-26 RX ADMIN — CLOPIDOGREL BISULFATE 75 MG: 75 TABLET ORAL at 15:22

## 2020-03-26 RX ADMIN — PANTOPRAZOLE SODIUM 40 MG: 40 TABLET, DELAYED RELEASE ORAL at 20:31

## 2020-03-26 RX ADMIN — BUSPIRONE HYDROCHLORIDE 30 MG: 10 TABLET ORAL at 09:49

## 2020-03-26 RX ADMIN — SERTRALINE HYDROCHLORIDE 200 MG: 100 TABLET ORAL at 20:32

## 2020-03-26 RX ADMIN — ACETAMINOPHEN 650 MG: 325 TABLET, FILM COATED ORAL at 09:51

## 2020-03-26 RX ADMIN — LISINOPRIL 10 MG: 10 TABLET ORAL at 09:49

## 2020-03-26 NOTE — PAYOR COMM NOTE
"Amy Purcell RN  Utilization Review  P: [phone]  F: [phone]    Member ID = [id number]  Initial notification & clinicals for inpatient auth    Karen Casper (45 y.o. Female)     Date of Birth Social Security Number Address Home Phone MRN    1975  [address]  [facility] [address] [phone] [id number]    Gnosticist Marital Status          Unknown Single       Admission Date Admission Type Admitting Provider Attending Provider Department, Room/Bed    3/25/20 Emergency Adalberto Rand MD Aslam, Azhar, MD [facility] 4H, S468/1    Discharge Date Discharge Disposition Discharge Destination                       Attending Provider:  Adalberto Rand MD    Allergies:  No Known Allergies    Isolation:  None   Infection:  None   Code Status:  CPR    Ht:  172.7 cm (68\")   Wt:  92.5 kg (204 lb)    Admission Cmt:  None   Principal Problem:  None                Active Insurance as of 3/25/2020     Primary Coverage     Payor Plan Insurance Group Employer/Plan Group    [facility] [employer] [facility] [employer] [place]      Payor Plan Address Payor Plan Phone Number Payor Plan Fax Number Effective Dates    [address] [address]   2017 - None Entered    [place] [address]       Subscriber Name Subscriber Birth Date Member ID       KAREN CASPER 1975 [id number]                 Emergency Contacts      (Rel.) Home Phone Work Phone Mobile Phone    unknown,no [phone] -- --               History & Physical      Adalberto Rand MD at 20            Date of Hospital Visit: 20  Encounter Provider: Adalberto Rand MD  Place of Service: [facility]  Patient Name: Karen Wagner  :1975  Referral Provider: No ref. provider found  Primary Care Provider: No primary care provider on file.    Chief complaint/Reason for Consultation: Chest pain/ST elevation MI    History of Present Illness:  The patient is a 45-year-old female with " "history of hypertension diagnosed 3 months ago.  She also has a history of anxiety and depression.  She takes lisinopril, Protonix and some psychiatric medications.  She reports a history of some sort of cardiac arrhythmia 10 to 15 years ago and states that at that time they had to \"stop/restart my heart with medications\" and she was also treated with labetalol.  She does not recall the details.  It is not clear whether she had atrial fibrillation or SVT.  The patient was in her usual state of health and at around 615 this p.m. while she was getting ready for dinner she started experiencing retrosternal burning chest discomfort with radiation into the jaw and left arm.  She called EMS, onsite EKG was of concern for ST elevation MI, cardiac authorization lab was activated and she was brought here.  En route she was started on nitroglycerin, received Plavix and heparin in the ER.  By the time of arrival to the Cath Lab her symptoms had significantly improved but she still had mild ongoing chest discomfort.  Review of systems is negative for recent fever chills abdominal pain nausea vomiting diarrhea constipation or urinary symptoms.  No symptoms of stroke.  All other review of systems are negative.  No past medical history on file.  Hypertension and anxiety/depression.  No past surgical history on file.   sections x2.  No medications prior to admission.   Social history: Non-smoker, , has a 17-year-old and a 5-year-old son.  Family history: Noncontributing.  REVIEW OF SYSTEMS:     12 point ROS was performed and is Negative except as outlined in HPI     Objective:     Vitals:    20   BP: (!) 149/101 146/90 141/95   BP Location: Right arm     Patient Position: Sitting     Pulse: 99 102 90   Resp:    Temp: 98.3 °F (36.8 °C)     TempSrc: Oral     SpO2: 98% 98% 99%   Weight: 92.5 kg (204 lb)     Height: 172.7 cm (68\")       Body mass index is 31.02 " "kg/m².  Flowsheet Rows      First Filed Value   Admission Height  172.7 cm (68\") Documented at 03/25/2020 1959   Admission Weight  92.5 kg (204 lb) Documented at 03/25/2020 1959          Physical Exam   General: No acute distress, well-developed and well-nourished.    Skin: Skin is warm and dry. No obvious cyanosis, erythema or pallor.   HEENT: Atraumatic, normocephalic, no conjunctival pallor, no scleral icterus.   Neck: Supple, no JVD. Normal carotid upstrokes, no bruits.    Chest:No respiratory distres No chest wall tenderness. Breath sounds are normal. No wheezes,  rhonchi or rales.  Cardiovascular: Normal S1 and S2, no murmer, gallop or rub. PMI is not displaced.    Pulses:Radial and pedal pulses are 2+ and symmetric.    Abdomen: Soft, non tender, normal bowel sounds.   Musculoskeletal/Extremities:  No clubbing, cyanosis or edema. No gross deformity.   Neurological: Alert and oriented to person, place, and time, no gross focal deficits.   Psychiatric: Normal mood and affect.Speech and behavior is normal.    Lab Review:                    Results from last 7 days   Lab Units 03/25/20 2001   TROPONIN T ng/mL <0.010     Results from last 7 days   Lab Units 03/25/20 2008 03/25/20 2001   WBC 10*3/mm3  --  7.20   HEMOGLOBIN g/dL  --  12.2   HEMOGLOBIN, POC g/dL 11.9*  --    HEMATOCRIT %  --  37.9   HEMATOCRIT POC % 35*  --    PLATELETS 10*3/mm3  --  264     Results from last 7 days   Lab Units 03/25/20 2001   INR  0.99     Results from last 7 days   Lab Units 03/25/20 2001   MAGNESIUM mg/dL 1.9         @LABRCNT(bnp)@  Imaging Results (Last 24 Hours)     Procedure Component Value Units Date/Time    XR Chest 1 View [[id number]] Collected:  03/25/20 2021     Updated:  03/25/20 2024    Narrative:       CR Chest 1 Vw    INDICATION:   Chest pain beginning earlier today     COMPARISON:    None available.    FINDINGS:  Single portable AP view(s) of the chest.  The heart and mediastinal contours are normal. The lungs are " clear. No pneumothorax or pleural effusion.      Impression:       No acute cardiopulmonary findings.    Signer Name: Kenton Ortiz MD   Signed: 3/25/2020 8:21 PM   Workstation Name: [facility]    [facility]           EKG: Sinus rhythm, inferior and lateral subtle ST segment elevations with anterior ST depressions consistent with inferior lateral/posterior ST elevation MI.    Assessment:   1. Acute ST elevation MI involving the inferior, lateral and posterior walls.  2. 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  3. Her chest pain has resolved.  We treated her with intravenous Cardene and nitroglycerin with some improvement of flow.  Due to distal/apical location of this lesion and spontaneous coronary dissection this is not suitable for intervention and the risk of vessel perforation and worsening of condition is high.  She generally has tortuous vessels suggesting fibromuscular dysplasia.  4. Hypertension.  5. History of anxiety and depression.    Plan:   We will treat her with aspirin, Plavix, beta-blockers, statins and ACE inhibitor's.  Integrilin for 12 hours.  Echocardiogram tomorrow.  Medical therapy and risk factor modification as well as management of stress is the mainstay of treatment which was discussed with the patient.  Further management per clinical course.  She will be admitted to telemetry for close monitoring and observation.  We will obtain an echocardiogram tomorrow.    .Adalberto Rand MD, [facility], Psychiatric        Electronically signed by Adalberto Rand MD at 03/25/20 2055       Vital Signs (last day)     Date/Time   Temp   Temp src   Pulse   Resp   BP   Patient Position   SpO2    03/26/20 0723   98.3 (36.8)   Oral   85   18   132/96   Lying   97    03/26/20 0700   --   --   74   --   --   --   97    03/26/20 0600   --   --   70   --   --   --    95    03/26/20 0500   --   --   69   --   --   --   98    03/26/20 0400   --   --   68   --   113/72   --   98    03/26/20 0300   97.4 (36.3)   Oral   69   18   106/89   Lying   --    03/26/20 0200   --   --   73   --   113/77   --   95    03/26/20 0100   --   --   70   --   108/73   --   96    03/26/20 0000   --   --   79   --   125/83   --   94    03/25/20 2330   --   --   69   --   135/94   --   97    03/25/20 2300   --   --   72   --   123/94   --   99    03/25/20 2245   --   --   72   --   126/84   --   98    03/25/20 2230   --   --   72   --   126/84   --   100    03/25/20 2220   --   --   76   --   128/86   --   99    03/25/20 2213   98.4 (36.9)   Oral   81   18   135/94   Lying   --    03/25/20 21:54:56   --   --   86   18   129/81   --   98    03/25/20 21:49:37   --   --   85   18   126/83   --   98    03/25/20 21:21:18   --   --   70   18   127/84   --   98    03/25/20 21:04:47   --   --   73   18   129/83   --   98    03/25/20 20:42:31   --   --   90   18   141/95   --   99    03/25/20 20:28:19   --   --   102   20   146/90   --   98    03/25/20 1959   98.3 (36.8)   Oral   99   18   (!) 149/101   Sitting   98                Facility-Administered Medications as of 3/26/2020   Medication Dose Route Frequency Provider Last Rate Last Dose   • acetaminophen (TYLENOL) tablet 650 mg  650 mg Oral Q4H PRN Adalberto Rand MD       • aluminum-magnesium hydroxide-simethicone (MAALOX MAX) 400-400-40 MG/5ML suspension 15 mL  15 mL Oral Q6H PRN Adalberto Rand MD       • aspirin chewable tablet 81 mg  81 mg Oral Daily Adalberto Rand MD       • atorvastatin (LIPITOR) tablet 40 mg  40 mg Oral Nightly Adalberto Rand MD   40 mg at 03/25/20 2249   • busPIRone (BUSPAR) tablet 30 mg  30 mg Oral Q12H Adalberto Rand MD   30 mg at 03/25/20 2249   • clopidogrel (PLAVIX) tablet 75 mg  75 mg Oral Daily Adalberto Rand MD       • [COMPLETED] clopidogrel (PLAVIX) tablet   Oral Code / Trauma / Sedation Medication Bud Maya MD   600 mg  at 20   • [COMPLETED] eptifibatide (INTEGRILIN) 75 MG/100ML infusion    Continuous PRN Adalberto Rand MD 14.8 mL/hr at 20 2 mcg/kg/min at 20   • [COMPLETED] eptifibatide (INTEGRILIN) 75 MG/100ML infusion  2 mcg/kg/min Intravenous Once Adalberto Rand MD   Stopped at 20   • [COMPLETED] heparin (porcine) 5000 UNIT/ML injection   Intravenous Code / Trauma / Sedation Medication Bud Maya MD   5,552.04 Units at 20   • lisinopril (PRINIVIL,ZESTRIL) tablet 10 mg  10 mg Oral Daily Adalberto Rand MD       • magnesium hydroxide (MILK OF MAGNESIA) suspension 2400 mg/10mL 10 mL  10 mL Oral Daily PRN Adalberto Rand MD       • metoprolol tartrate (LOPRESSOR) tablet 50 mg  50 mg Oral Q12H Adalberto Rand MD       • nitroglycerin 50 mg/250 mL (0.2 mg/mL) infusion  5-200 mcg/min Intravenous Titrated Adalberto Rand MD 3 mL/hr at 20 10 mcg/min at 20   • [COMPLETED] nitroglycerin infusion 100 mcg/mL    Continuous PRN Adalberto Rand MD 6 mL/hr at 20 10 mcg/min at 20   • ondansetron (ZOFRAN) tablet 4 mg  4 mg Oral Q6H PRN Adalberto Rand MD        Or   • ondansetron (ZOFRAN) injection 4 mg  4 mg Intravenous Q6H PRN Adalberto Rand MD       • pantoprazole (PROTONIX) EC tablet 40 mg  40 mg Oral Nightly Adalberto Rand MD   40 mg at 20   • Pharmacy Consult - MTM   Does not apply Daily Julia Montiel EVON       • sertraline (ZOLOFT) tablet 200 mg  200 mg Oral Nightly Adalberto Rand MD   200 mg at 20   • Sodium Chloride (PF) 0.9 % 10 mL  10 mL Intravenous PRN Adalberto Rand MD       • [] sodium chloride 0.9 % infusion  100 mL/hr Intravenous Continuous Adalberto Rand MD   Stopped at 20 0358   • traZODone (DESYREL) tablet 50 mg  50 mg Oral Nightly Adalberto Rand MD   50 mg at 20 5382     Lab Results (all)     Procedure Component Value Units Date/Time    Basic Metabolic Panel [[id number]]  (Abnormal) Collected:  20  0429    Specimen:  Blood Updated:  03/26/20 0559     Glucose 112 mg/dL      BUN 11 mg/dL      Creatinine 0.59 mg/dL      Sodium 139 mmol/L      Potassium 3.8 mmol/L      Chloride 107 mmol/L      CO2 21.0 mmol/L      Calcium 8.1 mg/dL      eGFR Non African Amer 110 mL/min/1.73      BUN/Creatinine Ratio 18.6     Anion Gap 11.0 mmol/L     Narrative:       GFR Normal >60  Chronic Kidney Disease <60  Kidney Failure <15      Troponin [[id number]]  (Abnormal) Collected:  03/26/20 0429    Specimen:  Blood Updated:  03/26/20 0550     Troponin T 1.500 ng/mL     Narrative:       Troponin T Reference Range:  <= 0.03 ng/mL-   Negative for AMI  >0.03 ng/mL-     Abnormal for myocardial necrosis.  Clinicians would have to utilize clinical acumen, EKG, Troponin and serial changes to determine if it is an Acute Myocardial Infarction or myocardial injury due to an underlying chronic condition.       Results may be falsely decreased if patient taking Biotin.      CBC (No Diff) [[id number]]  (Abnormal) Collected:  03/26/20 0429    Specimen:  Blood Updated:  03/26/20 0515     WBC 6.97 10*3/mm3      RBC 3.69 10*6/mm3      Hemoglobin 10.8 g/dL      Hematocrit 34.3 %      MCV 93.0 fL      MCH 29.3 pg      MCHC 31.5 g/dL      RDW 12.8 %      RDW-SD 43.8 fl      MPV 9.6 fL      Platelets 230 10*3/mm3     Troponin [[id number]]  (Abnormal) Collected:  03/25/20 2245    Specimen:  Blood Updated:  03/25/20 2340     Troponin T 0.634 ng/mL     Narrative:       Troponin T Reference Range:  <= 0.03 ng/mL-   Negative for AMI  >0.03 ng/mL-     Abnormal for myocardial necrosis.  Clinicians would have to utilize clinical acumen, EKG, Troponin and serial changes to determine if it is an Acute Myocardial Infarction or myocardial injury due to an underlying chronic condition.       Results may be falsely decreased if patient taking Biotin.      Magnesium [[id number]]  (Normal) Collected:  03/25/20 2245    Specimen:  Blood Updated:  03/25/20 2322     Magnesium  1.9 mg/dL     Hemoglobin A1c [[id number]]  (Normal) Collected:  03/25/20 2001    Specimen:  Blood Updated:  03/25/20 2307     Hemoglobin A1C 5.50 %     Narrative:       Hemoglobin A1C Ranges:    Increased Risk for Diabetes  5.7% to 6.4%  Diabetes                     >= 6.5%  Diabetic Goal                < 7.0%    Lipid Panel [[id number]]  (Abnormal) Collected:  03/25/20 2001    Specimen:  Blood Updated:  03/25/20 2238     Total Cholesterol 233 mg/dL      Triglycerides 81 mg/dL      HDL Cholesterol 71 mg/dL      LDL Cholesterol  146 mg/dL      VLDL Cholesterol 16.2 mg/dL      LDL/HDL Ratio 2.05    Narrative:       Cholesterol Reference Ranges  (U.S. Department of Health and Human Services ATP III Classifications)    Desirable          <200 mg/dL  Borderline High    200-239 mg/dL  High Risk          >240 mg/dL      Triglyceride Reference Ranges  (U.S. Department of Health and Human Services ATP III Classifications)    Normal           <150 mg/dL  Borderline High  150-199 mg/dL  High             200-499 mg/dL  Very High        >500 mg/dL    HDL Reference Ranges  (U.S. Department of Health and Human Services ATP III Classifcations)    Low     <40 mg/dl (major risk factor for CHD)  High    >60 mg/dl ('negative' risk factor for CHD)        LDL Reference Ranges  (U.S. Department of Health and Human Services ATP III Classifcations)    Optimal          <100 mg/dL  Near Optimal     100-129 mg/dL  Borderline High  130-159 mg/dL  High             160-189 mg/dL  Very High        >189 mg/dL    [place] Draw [[id number]] Collected:  03/25/20 2001    Specimen:  Blood Updated:  03/25/20 2115    Narrative:       The following orders were created for panel order [place] Draw.  Procedure                               Abnormality         Status                     ---------                               -----------         ------                     Light Blue Top[[id number]]                                   Final result               Green  Top (Gel)[[id number]]                                  Final result               Lavender Top[[id number]]                                     Final result               Gold Top - SST[[id number]]                                   Final result                 Please view results for these tests on the individual orders.    Light Blue Top [[id number]] Collected:  03/25/20 2001    Specimen:  Blood Updated:  03/25/20 2115     Extra Tube hold for add-on     Comment: Auto resulted       Green Top (Gel) [[id number]] Collected:  03/25/20 2001    Specimen:  Blood Updated:  03/25/20 2115     Extra Tube Hold for add-ons.     Comment: Auto resulted.       Lavender Top [[id number]] Collected:  03/25/20 2001    Specimen:  Blood Updated:  03/25/20 2115     Extra Tube hold for add-on     Comment: Auto resulted       Gold Top - SST [[id number]] Collected:  03/25/20 2001    Specimen:  Blood Updated:  03/25/20 2115     Extra Tube Hold for add-ons.     Comment: Auto resulted.       Troponin [[id number]]  (Normal) Collected:  03/25/20 2001    Specimen:  Blood Updated:  03/25/20 2024     Troponin T <0.010 ng/mL     Narrative:       Troponin T Reference Range:  <= 0.03 ng/mL-   Negative for AMI  >0.03 ng/mL-     Abnormal for myocardial necrosis.  Clinicians would have to utilize clinical acumen, EKG, Troponin and serial changes to determine if it is an Acute Myocardial Infarction or myocardial injury due to an underlying chronic condition.       Results may be falsely decreased if patient taking Biotin.      Protime-INR [[id number]]  (Normal) Collected:  03/25/20 2001    Specimen:  Blood Updated:  03/25/20 2021     Protime 12.8 Seconds      INR 0.99    Magnesium [[id number]]  (Normal) Collected:  03/25/20 2001    Specimen:  Blood Updated:  03/25/20 2020     Magnesium 1.9 mg/dL     POC Surgery Labs [[id number]]  (Abnormal) Collected:  03/25/20 2008    Specimen:  Blood Updated:  03/25/20 2011     Ionized Calcium 1.20 mmol/L      POC Potassium 3.4  mmol/L      Sodium 137 mmol/L      Total CO2 26 mmol/L      Hemoglobin 11.9 g/dL      Hematocrit 35 %      pCO2, Arterial 38.9 mm Hg      pO2, Arterial 25 mmHg      Comment: Serial Number: [id number]:  [id number]        Base Excess 0.0000 mmol/L      O2 Saturation, Arterial 45 %      pH, Arterial 7.40 pH units      HCO3, Arterial 24.4 mmol/L     CBC & Differential [[id number]] Collected:  03/25/20 2001    Specimen:  Blood Updated:  03/25/20 2008    Narrative:       The following orders were created for panel order CBC & Differential.  Procedure                               Abnormality         Status                     ---------                               -----------         ------                     CBC Auto Differential[[id number]]        Normal              Final result                 Please view results for these tests on the individual orders.    CBC Auto Differential [[id number]]  (Normal) Collected:  03/25/20 2001    Specimen:  Blood Updated:  03/25/20 2008     WBC 7.20 10*3/mm3      RBC 4.10 10*6/mm3      Hemoglobin 12.2 g/dL      Hematocrit 37.9 %      MCV 92.4 fL      MCH 29.8 pg      MCHC 32.2 g/dL      RDW 12.7 %      RDW-SD 42.8 fl      MPV 9.3 fL      Platelets 264 10*3/mm3      Neutrophil % 56.7 %      Lymphocyte % 33.8 %      Monocyte % 7.5 %      Eosinophil % 1.3 %      Basophil % 0.4 %      Immature Grans % 0.3 %      Neutrophils, Absolute 4.09 10*3/mm3      Lymphocytes, Absolute 2.43 10*3/mm3      Monocytes, Absolute 0.54 10*3/mm3      Eosinophils, Absolute 0.09 10*3/mm3      Basophils, Absolute 0.03 10*3/mm3      Immature Grans, Absolute 0.02 10*3/mm3      nRBC 0.0 /100 WBC           Imaging Results (All)     Procedure Component Value Units Date/Time    XR Chest 1 View [[id number]] Collected:  03/25/20 2021     Updated:  03/25/20 2024    Narrative:       CR Chest 1 Vw    INDICATION:   Chest pain beginning earlier today     COMPARISON:    None available.    FINDINGS:  Single portable AP view(s)  "of the chest.  The heart and mediastinal contours are normal. The lungs are clear. No pneumothorax or pleural effusion.      Impression:       No acute cardiopulmonary findings.    Signer Name: Kenton Ortiz MD   Signed: 3/25/2020 8:21 PM   Workstation Name: [id number]    [facility]          ECG/EMG Results (all)     Procedure Component Value Units Date/Time    ECG 12 Lead [[id number]] Collected:  03/26/20 0523     Updated:  03/26/20 0659    Narrative:       Test Reason : stemi  Blood Pressure : **/** mmHG  Vent. Rate : 075 BPM     Atrial Rate : 075 BPM     P-R Int : 160 ms          QRS Dur : 080 ms      QT Int : 422 ms       P-R-T Axes : 074 039 -06 degrees     QTc Int : 471 ms    Normal sinus rhythm  Low voltage QRS  Cannot rule out Anterior infarct (cited on or before 25-MAR-2020)  Abnormal ECG  When compared with ECG of 25-MAR-2020 20:00, (Unconfirmed)  Serial changes of evolving Anterior infarct present    Referred By:             Confirmed By:     ECG 12 Lead [[id number]] Collected:  03/25/20 2000     Updated:  03/26/20 0704          Operative/Procedure Notes (all)    No notes of this type exist for this encounter.            Physician Progress Notes (all)      Adalberto Rand MD at 03/26/20 0842          [facility]  IP Progress Note      Chief Complaint: Follow-up of acute MI.    Subjective   Feels better, no current chest discomfort or shortness of breath.  Getting ready to have her echocardiogram.  Wants her allergy medications restarted.    Objective     Blood pressure 132/96, pulse 85, temperature 98.3 °F (36.8 °C), temperature source Oral, resp. rate 18, height 172.7 cm (68\"), weight 92.5 kg (204 lb), SpO2 97 %.   No intake or output data in the 24 hours ending 03/26/20 0842    Physical Exam:  General: No acute distress.   Neck: no JVD.  Chest:No respiratory distress, breath sounds are normal. No wheezes,  rhonchi or rales.  Cardiovascular: " Normal S1 and S2, no murmer, gallop or rub.    Extremities: No edema.  Right groin stable, no bleeding or hematoma.    Results Review:     I reviewed the patient's new clinical results.    Results from last 7 days   Lab Units 03/26/20  0429   WBC 10*3/mm3 6.97   HEMOGLOBIN g/dL 10.8*   HEMATOCRIT % 34.3   PLATELETS 10*3/mm3 230     Results from last 7 days   Lab Units 03/26/20  0429   SODIUM mmol/L 139   POTASSIUM mmol/L 3.8   CHLORIDE mmol/L 107   CO2 mmol/L 21.0*   BUN mg/dL 11   CREATININE mg/dL 0.59   CALCIUM mg/dL 8.1*   GLUCOSE mg/dL 112*     Results from last 7 days   Lab Units 03/26/20  0429   SODIUM mmol/L 139   POTASSIUM mmol/L 3.8   CHLORIDE mmol/L 107   CO2 mmol/L 21.0*   BUN mg/dL 11   CREATININE mg/dL 0.59   GLUCOSE mg/dL 112*   CALCIUM mg/dL 8.1*     Results from last 7 days   Lab Units 03/25/20 2001   INR  0.99     Lab Results   Component Value Date    TROPONINT 1.500 (C) 03/26/2020         Results from last 7 days   Lab Units 03/25/20 2001   CHOLESTEROL mg/dL 233*   TRIGLYCERIDES mg/dL 81   HDL CHOL mg/dL 71*   LDL CHOL mg/dL 146*           Tele: Sinus Rythym, telemetry showed AIVR.      Assessment:  1. Acute ST elevation MI due to SCAD, no significant LV dysfunction, no occlusive CAD.  2. Stable overnight.  No current chest pain.  3. AI VR, probably post MI.  4. Hypertension.  5. Previously untreated dyslipidemia.  6. Seasonal allergies.  7. Anxiety/depression.  Plan:  1. Echocardiogram today to assess LV function and valve function.  2. Increase metoprolol to 50 mg twice daily.  3. Continue aspirin, Plavix and high intensity statin as well as home dose of lisinopril.  4. Restart allergy medications.  5. Increase activities as tolerated.  6. Hopefully home tomorrow.    Adalberto Rand MD, [facility], [facility]                                      Electronically signed by Adalberto Rand MD at 03/26/20 0819       Consult Notes (all)    No notes of this type exist for this encounter.

## 2020-03-26 NOTE — H&P
"  Date of Hospital Visit: 20  Encounter Provider: Adalberto Rand MD  Place of Service: [facility]  Patient Name: Karen Wagner  :1975  Referral Provider: No ref. provider found  Primary Care Provider: No primary care provider on file.    Chief complaint/Reason for Consultation: Chest pain/ST elevation MI    History of Present Illness:  The patient is a 45-year-old female with history of hypertension diagnosed 3 months ago.  She also has a history of anxiety and depression.  She takes lisinopril, Protonix and some psychiatric medications.  She reports a history of some sort of cardiac arrhythmia 10 to 15 years ago and states that at that time they had to \"stop/restart my heart with medications\" and she was also treated with labetalol.  She does not recall the details.  It is not clear whether she had atrial fibrillation or SVT.  The patient was in her usual state of health and at around 615 this p.m. while she was getting ready for dinner she started experiencing retrosternal burning chest discomfort with radiation into the jaw and left arm.  She called EMS, onsite EKG was of concern for ST elevation MI, cardiac authorization lab was activated and she was brought here.  En route she was started on nitroglycerin, received Plavix and heparin in the ER.  By the time of arrival to the Cath Lab her symptoms had significantly improved but she still had mild ongoing chest discomfort.  Review of systems is negative for recent fever chills abdominal pain nausea vomiting diarrhea constipation or urinary symptoms.  No symptoms of stroke.  All other review of systems are negative.  No past medical history on file.  Hypertension and anxiety/depression.  No past surgical history on file.   sections x2.  No medications prior to admission.   Social history: Non-smoker, , has a 17-year-old and a 5-year-old son.  Family history: Noncontributing.  REVIEW OF SYSTEMS:     12 point ROS was " "performed and is Negative except as outlined in HPI     Objective:     Vitals:    03/25/20 1959 03/25/20 2028 03/25/20 2042   BP: (!) 149/101 146/90 141/95   BP Location: Right arm     Patient Position: Sitting     Pulse: 99 102 90   Resp: 18 20 18   Temp: 98.3 °F (36.8 °C)     TempSrc: Oral     SpO2: 98% 98% 99%   Weight: 92.5 kg (204 lb)     Height: 172.7 cm (68\")       Body mass index is 31.02 kg/m².  Flowsheet Rows      First Filed Value   Admission Height  172.7 cm (68\") Documented at 03/25/2020 1959   Admission Weight  92.5 kg (204 lb) Documented at 03/25/2020 1959          Physical Exam   General: No acute distress, well-developed and well-nourished.    Skin: Skin is warm and dry. No obvious cyanosis, erythema or pallor.   HEENT: Atraumatic, normocephalic, no conjunctival pallor, no scleral icterus.   Neck: Supple, no JVD. Normal carotid upstrokes, no bruits.    Chest:No respiratory distres No chest wall tenderness. Breath sounds are normal. No wheezes,  rhonchi or rales.  Cardiovascular: Normal S1 and S2, no murmer, gallop or rub. PMI is not displaced.    Pulses:Radial and pedal pulses are 2+ and symmetric.    Abdomen: Soft, non tender, normal bowel sounds.   Musculoskeletal/Extremities:  No clubbing, cyanosis or edema. No gross deformity.   Neurological: Alert and oriented to person, place, and time, no gross focal deficits.   Psychiatric: Normal mood and affect.Speech and behavior is normal.    Lab Review:                    Results from last 7 days   Lab Units 03/25/20 2001   TROPONIN T ng/mL <0.010     Results from last 7 days   Lab Units 03/25/20 2008 03/25/20 2001   WBC 10*3/mm3  --  7.20   HEMOGLOBIN g/dL  --  12.2   HEMOGLOBIN, POC g/dL 11.9*  --    HEMATOCRIT %  --  37.9   HEMATOCRIT POC % 35*  --    PLATELETS 10*3/mm3  --  264     Results from last 7 days   Lab Units 03/25/20 2001   INR  0.99     Results from last 7 days   Lab Units 03/25/20 2001   MAGNESIUM mg/dL 1.9       "   @LABRCNT(bnp)@  Imaging Results (Last 24 Hours)     Procedure Component Value Units Date/Time    XR Chest 1 View [[id number]] Collected:  03/25/20 2021     Updated:  03/25/20 2024    Narrative:       CR Chest 1 Vw    INDICATION:   Chest pain beginning earlier today     COMPARISON:    None available.    FINDINGS:  Single portable AP view(s) of the chest.  The heart and mediastinal contours are normal. The lungs are clear. No pneumothorax or pleural effusion.      Impression:       No acute cardiopulmonary findings.    Signer Name: Kenton Ortiz MD   Signed: 3/25/2020 8:21 PM   Workstation Name: [id number]    [facility]           EKG: Sinus rhythm, inferior and lateral subtle ST segment elevations with anterior ST depressions consistent with inferior lateral/posterior ST elevation MI.    Assessment:   1. Acute ST elevation MI involving the inferior, lateral and posterior walls.  2. 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  3. Her chest pain has resolved.  We treated her with intravenous Cardene and nitroglycerin with some improvement of flow.  Due to distal/apical location of this lesion and spontaneous coronary dissection this is not suitable for intervention and the risk of vessel perforation and worsening of condition is high.  She generally has tortuous vessels suggesting fibromuscular dysplasia.  4. Hypertension.  5. History of anxiety and depression.    Plan:   We will treat her with aspirin, Plavix, beta-blockers, statins and ACE inhibitor's.  Integrilin for 12 hours.  Echocardiogram tomorrow.  Medical therapy and risk factor modification as well as management of stress is the mainstay of treatment which was discussed with the patient.  Further management per clinical course.  She will be admitted to telemetry for close monitoring and observation.   We will obtain an echocardiogram tomorrow.    .Adalberto Rand MD, FACC, [facility]

## 2020-03-26 NOTE — PLAN OF CARE
Problem: Patient Care Overview  Goal: Plan of Care Review  Outcome: Ongoing (interventions implemented as appropriate)  Flowsheets (Taken 3/26/2020 1614)  Plan of Care Reviewed With: patient  Note:   Pt VSS. Continued on nitro gtt 10 mcg. Patient has a flat affect. RA. UWSB. Pt states she feels better today and is excited to go home. Will continue to monitor

## 2020-03-26 NOTE — PROGRESS NOTES
"[facility]  IP Progress Note      Chief Complaint: Follow-up of acute MI.    Subjective   Feels better, no current chest discomfort or shortness of breath.  Getting ready to have her echocardiogram.  Wants her allergy medications restarted.    Objective     Blood pressure 132/96, pulse 85, temperature 98.3 °F (36.8 °C), temperature source Oral, resp. rate 18, height 172.7 cm (68\"), weight 92.5 kg (204 lb), SpO2 97 %.   No intake or output data in the 24 hours ending 03/26/20 0842    Physical Exam:  General: No acute distress.   Neck: no JVD.  Chest:No respiratory distress, breath sounds are normal. No wheezes,  rhonchi or rales.  Cardiovascular: Normal S1 and S2, no murmer, gallop or rub.    Extremities: No edema.  Right groin stable, no bleeding or hematoma.    Results Review:     I reviewed the patient's new clinical results.    Results from last 7 days   Lab Units 03/26/20  0429   WBC 10*3/mm3 6.97   HEMOGLOBIN g/dL 10.8*   HEMATOCRIT % 34.3   PLATELETS 10*3/mm3 230     Results from last 7 days   Lab Units 03/26/20  0429   SODIUM mmol/L 139   POTASSIUM mmol/L 3.8   CHLORIDE mmol/L 107   CO2 mmol/L 21.0*   BUN mg/dL 11   CREATININE mg/dL 0.59   CALCIUM mg/dL 8.1*   GLUCOSE mg/dL 112*     Results from last 7 days   Lab Units 03/26/20  0429   SODIUM mmol/L 139   POTASSIUM mmol/L 3.8   CHLORIDE mmol/L 107   CO2 mmol/L 21.0*   BUN mg/dL 11   CREATININE mg/dL 0.59   GLUCOSE mg/dL 112*   CALCIUM mg/dL 8.1*     Results from last 7 days   Lab Units 03/25/20 2001   INR  0.99     Lab Results   Component Value Date    TROPONINT 1.500 (C) 03/26/2020         Results from last 7 days   Lab Units 03/25/20 2001   CHOLESTEROL mg/dL 233*   TRIGLYCERIDES mg/dL 81   HDL CHOL mg/dL 71*   LDL CHOL mg/dL 146*           Tele: Sinus Rythym, telemetry showed AIVR.      Assessment:  1. Acute ST elevation MI due to SCAD, no significant LV dysfunction, no occlusive CAD.  2. Stable overnight.  No current " chest pain.  3. AI VR, probably post MI.  4. Hypertension.  5. Previously untreated dyslipidemia.  6. Seasonal allergies.  7. Anxiety/depression.  Plan:  1. Echocardiogram today to assess LV function and valve function.  2. Increase metoprolol to 50 mg twice daily.  3. Continue aspirin, Plavix and high intensity statin as well as home dose of lisinopril.  4. Restart allergy medications.  5. Increase activities as tolerated.  6. Hopefully home tomorrow.    Adalberto Rand MD, FACC, [facility]

## 2020-03-26 NOTE — PROGRESS NOTES
Discharge Planning Assessment  [facility]     Patient Name: Karen Casper  MRN: [id number]  Today's Date: 3/26/2020    Admit Date: 3/25/2020    Discharge Needs Assessment     Row Name 03/26/20 1022       Living Environment    Lives With  spouse    Name(s) of Who Lives With Patient      Current Living Arrangements  home/apartment/condo    Primary Care Provided by  self    Provides Primary Care For  no one    Family Caregiver if Needed  spouse;parent(s)    Family Caregiver Names   and mother    Quality of Family Relationships  unable to assess    Able to Return to Prior Arrangements  yes       Resource/Environmental Concerns    Resource/Environmental Concerns  none    Transportation Concerns  car, none       Transition Planning    Patient/Family Anticipates Transition to  home with family    Patient/Family Anticipated Services at Transition  none    Transportation Anticipated  car, drives self;family or friend will provide       Discharge Needs Assessment    Concerns to be Addressed  denies needs/concerns at this time    Equipment Currently Used at Home  none    Equipment Needed After Discharge  none    Provided Post Acute Provider List?  N/A    N/A Provider List Comment  Plan is home        Discharge Plan     Row Name 03/26/20 1023       Plan    Plan  Home with family    Patient/Family in Agreement with Plan  yes    Plan Comments  Spoke with pt. at bedside. Lives with spouse in [place] Independent with ADL's. No DME and voices no needs. Plan is to dc to home with family providing transport.     Final Discharge Disposition Code  01 - home or self-care        Destination      Coordination has not been started for this encounter.      Durable Medical Equipment      Coordination has not been started for this encounter.      Dialysis/Infusion      Coordination has not been started for this encounter.      Home Medical Care      Coordination has not been started for this encounter.      Therapy       Coordination has not been started for this encounter.      Community Resources      Coordination has not been started for this encounter.          Demographic Summary    No documentation.       Functional Status     Row Name 03/26/20 1021       Functional Status    Usual Activity Tolerance  good       Functional Status, IADL    Medications  independent    Meal Preparation  independent    Housekeeping  independent    Laundry  independent    Shopping  independent       Mental Status Summary    Recent Changes in Mental Status/Cognitive Functioning  no changes        Psychosocial    No documentation.       Abuse/Neglect    No documentation.       Legal    No documentation.       Substance Abuse    No documentation.       Patient Forms    No documentation.           Megha Kaufman RN

## 2020-03-27 VITALS
SYSTOLIC BLOOD PRESSURE: 119 MMHG | BODY MASS INDEX: 30.92 KG/M2 | HEIGHT: 68 IN | HEART RATE: 98 BPM | DIASTOLIC BLOOD PRESSURE: 81 MMHG | RESPIRATION RATE: 18 BRPM | WEIGHT: 204 LBS | TEMPERATURE: 98.6 F | OXYGEN SATURATION: 98 %

## 2020-03-27 PROCEDURE — 99238 HOSP IP/OBS DSCHRG MGMT 30/<: CPT | Performed by: INTERNAL MEDICINE

## 2020-03-27 RX ORDER — NITROGLYCERIN 0.4 MG/1
TABLET SUBLINGUAL
Qty: 25 TABLET | Refills: 1 | Status: SHIPPED | OUTPATIENT
Start: 2020-03-27

## 2020-03-27 RX ORDER — ATORVASTATIN CALCIUM 40 MG/1
40 TABLET, FILM COATED ORAL NIGHTLY
Qty: 90 TABLET | Refills: 3 | Status: SHIPPED | OUTPATIENT
Start: 2020-03-27

## 2020-03-27 RX ORDER — CLOPIDOGREL BISULFATE 75 MG/1
75 TABLET ORAL DAILY
Qty: 90 TABLET | Refills: 3 | Status: SHIPPED | OUTPATIENT
Start: 2020-03-27

## 2020-03-27 RX ORDER — ASPIRIN 81 MG/1
81 TABLET, CHEWABLE ORAL DAILY
Qty: 100 TABLET | Refills: 3 | Status: SHIPPED | OUTPATIENT
Start: 2020-03-27

## 2020-03-27 RX ORDER — METOPROLOL TARTRATE 50 MG/1
50 TABLET, FILM COATED ORAL EVERY 12 HOURS SCHEDULED
Qty: 180 TABLET | Refills: 3 | Status: SHIPPED | OUTPATIENT
Start: 2020-03-27

## 2020-03-27 RX ADMIN — METOPROLOL TARTRATE 50 MG: 50 TABLET, FILM COATED ORAL at 08:23

## 2020-03-27 RX ADMIN — ASPIRIN 81 MG 81 MG: 81 TABLET ORAL at 08:23

## 2020-03-27 RX ADMIN — BUSPIRONE HYDROCHLORIDE 30 MG: 10 TABLET ORAL at 08:23

## 2020-03-27 RX ADMIN — CLOPIDOGREL BISULFATE 75 MG: 75 TABLET ORAL at 08:24

## 2020-03-27 RX ADMIN — LISINOPRIL 10 MG: 10 TABLET ORAL at 08:24

## 2020-03-27 RX ADMIN — ACETAMINOPHEN 650 MG: 325 TABLET, FILM COATED ORAL at 04:07

## 2020-03-27 NOTE — PROGRESS NOTES
"[facility]  IP Progress Note      Chief Complaint: Follow-up of acute MI.    Subjective   Feels okay, no chest pain or shortness of breath.  Complaining of headache on nitroglycerin.  Did not sleep well due to helicopter sounds.    Objective     Blood pressure 113/74, pulse 89, temperature 98.3 °F (36.8 °C), temperature source Oral, resp. rate 16, height 172.7 cm (68\"), weight 92.5 kg (204 lb), SpO2 98 %.     Intake/Output Summary (Last 24 hours) at 3/27/2020 0705  Last data filed at 3/26/2020 1900  Gross per 24 hour   Intake 840 ml   Output --   Net 840 ml       Physical Exam:  General: No apparent distress.  Neck: no JVD.  Chest:No respiratory distress, breath sounds are normal. No wheezes,  rhonchi or rales.  Cardiovascular: Normal S1 and S2, no murmer, gallop or rub.    Extremities: No edema.  Right groin stable, no bleeding or hematoma.  Results Review:     I reviewed the patient's new clinical results.    Results from last 7 days   Lab Units 03/26/20 0429   WBC 10*3/mm3 6.97   HEMOGLOBIN g/dL 10.8*   HEMATOCRIT % 34.3   PLATELETS 10*3/mm3 230     Results from last 7 days   Lab Units 03/26/20 0429   SODIUM mmol/L 139   POTASSIUM mmol/L 3.8   CHLORIDE mmol/L 107   CO2 mmol/L 21.0*   BUN mg/dL 11   CREATININE mg/dL 0.59   CALCIUM mg/dL 8.1*   GLUCOSE mg/dL 112*     Results from last 7 days   Lab Units 03/26/20 0429   SODIUM mmol/L 139   POTASSIUM mmol/L 3.8   CHLORIDE mmol/L 107   CO2 mmol/L 21.0*   BUN mg/dL 11   CREATININE mg/dL 0.59   GLUCOSE mg/dL 112*   CALCIUM mg/dL 8.1*     Results from last 7 days   Lab Units 03/25/20 2001   INR  0.99     Lab Results   Component Value Date    TROPONINT 1.500 (C) 03/26/2020         Results from last 7 days   Lab Units 03/25/20 2001   CHOLESTEROL mg/dL 233*   TRIGLYCERIDES mg/dL 81   HDL CHOL mg/dL 71*   LDL CHOL mg/dL 146*           Tele: Sinus Rythym, telemetry showed AIVR.      Assessment:  1. Acute ST elevation MI due to SCAD, " no significant LV dysfunction, no occlusive CAD.  Echocardiogram unremarkable.  2. Stable overnight.  No current chest pain.  3. AI VR, probably post MI.  4. Hypertension.  5. Previously untreated dyslipidemia.  6. Seasonal allergies.  7. Anxiety/depression.  Plan:  1. Discharge to home, condition is stable.  2. Continue dual antiplatelet therapy, high intensity statin therapy, lisinopril and metoprolol.  3. Nitroglycerin as needed.  4. Follow-up with me in 2 to 3 months.    Adalberto Rand MD, FACC, [facility]

## 2020-03-28 ENCOUNTER — READMISSION MANAGEMENT (OUTPATIENT)
Dept: CALL CENTER | Facility: HOSPITAL | Age: 45
End: 2020-03-28

## 2020-03-28 NOTE — OUTREACH NOTE
Prep Survey      Responses   [facility] facility patient discharged from?  [place]   Is LACE score < 7 ?  Yes   Eligibility  Readm Mgmt   Discharge diagnosis  Acute ST elevation MI involving the inferior, lateral and posterior walls.   Does the patient have one of the following disease processes/diagnoses(primary or secondary)?  Acute MI (STEMI,NSTEMI)   Does the patient have Home health ordered?  No   Is there a DME ordered?  No   Prep survey completed?  Yes          Luz Soliz RN

## 2020-03-29 ENCOUNTER — NURSE TRIAGE (OUTPATIENT)
Dept: CALL CENTER | Facility: HOSPITAL | Age: 45
End: 2020-03-29

## 2020-03-30 ENCOUNTER — TELEPHONE (OUTPATIENT)
Dept: CARDIOLOGY | Facility: CLINIC | Age: 45
End: 2020-03-30

## 2020-03-30 ENCOUNTER — READMISSION MANAGEMENT (OUTPATIENT)
Dept: CALL CENTER | Facility: HOSPITAL | Age: 45
End: 2020-03-30

## 2020-03-30 NOTE — TELEPHONE ENCOUNTER
"    Reason for Disposition  • [1] Systolic BP  AND [2] taking blood pressure medications AND [3] dizzy, lightheaded or weak    Additional Information  • Negative: Started suddenly after an allergic medicine, an allergic food, or bee sting  • Negative: Shock suspected (e.g., cold/pale/clammy skin, too weak to stand, low BP, rapid pulse)  • Negative: Difficult to awaken or acting confused (e.g., disoriented, slurred speech)  • Negative: Fainted  • Negative: [1] Systolic BP < 90 AND [2] dizzy, lightheaded, or weak  • Negative: Chest pain  • Negative: Bleeding (e.g., vomiting blood, rectal bleeding or tarry stools, severe vaginal bleeding)(Exception: fainted from sight of small amount of blood; small cut or abrasion)  • Negative: Extra heart beats or heart is beating fast  (i.e., \"palpitations\")  • Negative: Sounds like a life-threatening emergency to the triager  • Negative: [1] Systolic BP < 80 AND [2] NOT dizzy, lightheaded or weak  • Negative: Abdominal pain  • Negative: Fever > 100.5 F (38.1 C)  • Negative: Major surgery in the past month  • Negative: [1] Drinking very little AND [2] dehydration suspected (e.g., no urine > 12 hours, very dry mouth, very lightheaded)  • Negative: [1] Fall in systolic BP > 20 mm Hg from normal AND [2] dizzy, lightheaded, or weak  • Negative: Patient sounds very sick or weak to the triager  • Negative: [1] Systolic BP < 90 AND [2] NOT dizzy, lightheaded or weak    Answer Assessment - Initial Assessment Questions  1. BLOOD PRESSURE: \"What is the blood pressure?\" \"Did you take at least two measurements 5 minutes apart?\"      93/67 HR 84  2. ONSET: \"When did you take your blood pressure?\"      Just prior to call  3. HOW: \"How did you obtain the blood pressure?\" (e.g., visiting nurse, automatic home BP monitor)      Automatic home BP monitor  4. HISTORY: \"Do you have a history of low blood pressure?\" \"What is your blood pressure normally?\"      Years ago; in the last 3 months HTN; " "134/89  5. MEDICATIONS: \"Are you taking any medications for blood pressure?\" If yes: \"Have they been changed recently?\"      Lisinopril and metoprolol; metoprolol is new this week  6. PULSE RATE: \"Do you know what your pulse rate is?\"       84  7. OTHER SYMPTOMS: \"Have you been sick recently?\" \"Have you had a recent injury?\"      Dizzy at times  8. PREGNANCY: \"Is there any chance you are pregnant?\" \"When was your last menstrual period?\"      na    Protocols used: LOW BLOOD PRESSURE-ADULT-AH      "

## 2020-03-30 NOTE — OUTREACH NOTE
AMI Week 1 Survey      Responses   [facility] patient discharged from?  [place]   Does the patient have one of the following disease processes/diagnoses(primary or secondary)?  Acute MI (STEMI,NSTEMI)   Is there a successful TCM telephone encounter documented?  No   Week 1 attempt successful?  Yes   Call start time  1854   Call end time  1901   Discharge diagnosis  Acute ST elevation MI involving the inferior, lateral and posterior walls.   Meds reviewed with patient/caregiver?  Yes   Is the patient having any side effects they believe may be caused by any medication additions or changes?  No   Does the patient have all prescriptions related to this admission filled (includes statins,anticoagulants,HTN meds,anti-arrhythmia meds)  Yes   Is the patient taking all medications as directed (includes completed medication regime)?  Yes   Does the patient have a primary care provider?   Yes   Does the patient have an appointment with their PCP,cardiologist,or clinic within 7 days of discharge?  Greater than 7 days   Comments regarding PCP  PCP appt 4/3/20   What is preventing the patient from scheduling follow up appointments within 7 days of discharge?  Earlier appointment not available   Nursing Interventions  Verified appointment date/time/provider   Has the patient kept scheduled appointments due by today?  N/A   Psychosocial issues?  No   Did the patient receive a copy of their discharge instructions?  Yes   Nursing interventions  Reviewed instructions with patient   What is the patient's perception of their health status since discharge?  Improving [Pt called cardiology 3/30/20 r/t hypotension. She has instructions on how to proceed. She will continue to monitor. ]   Nursing interventions  Nurse provided patient education   Is the patient/caregiver able to teach back signs and symptoms of when to call for help immediately:  Sudden chest discomfort, Sudden discomfort in arms, back, neck or jaw   Nursing  interventions  Nurse provided patient education   Is the patient/caregiver able to teach back lifestyle changes to help prevent MIs  Quit smoking, Heart healthy diet, Maintaining a healthy weight, Managing diabetes [nonsmoker,  not a diabetic]   Is the patient/caregiver able to teach back ways to prevent a second heart attack:  Take medications, Follow up with MD, Manage risk factors   If the patient is a current smoker, are they able to teach back resources for cessation?  -- [nonsmoker]   Is the patient/caregiver able to teach back the hierarchy of who to call/visit for symptoms/problems? PCP, Specialist, Home health nurse, Urgent Care, ED, 911  Yes   Week 1 call completed?  Yes          Tiki Burgos RN

## 2020-03-30 NOTE — TELEPHONE ENCOUNTER
Pt called w complaints of hypotension, BP 80-90/55-60. Pt is s/p heart cath and was started on metoprolol 50 mg BID. Instructed pt to half dose to 25 mg BID, continue monitoring BP and call back Wednesday w readings.  Instructed pt to hold dose if SBP </= 90 or HR < 60. Pt verbalized understanding and agreeable with plan.

## 2020-04-06 ENCOUNTER — READMISSION MANAGEMENT (OUTPATIENT)
Dept: CALL CENTER | Facility: HOSPITAL | Age: 45
End: 2020-04-06

## 2020-04-06 NOTE — OUTREACH NOTE
AMI Week 2 Survey      Responses   [facility] patient discharged from?  [place]   Does the patient have one of the following disease processes/diagnoses(primary or secondary)?  Acute MI (STEMI,NSTEMI)   Week 2 attempt successful?  Yes   Call start time  1544   Rescheduled  Rescheduled-pt requested [[place] requested update come from patient. He reports he's hard of hearing. ]   Call end time  1544   Discharge diagnosis  Acute ST elevation MI involving the inferior, lateral and posterior echeverria.   Person spoke with today (if not patient) and relationship  [place]-spouse (is hard of hearing)          Tiki Burgos RN

## 2020-04-07 ENCOUNTER — READMISSION MANAGEMENT (OUTPATIENT)
Dept: CALL CENTER | Facility: HOSPITAL | Age: 45
End: 2020-04-07

## 2020-04-07 NOTE — OUTREACH NOTE
AMI Week 2 Survey      Responses   [facility] patient discharged from?  [place]   Does the patient have one of the following disease processes/diagnoses(primary or secondary)?  Acute MI (STEMI,NSTEMI)   Week 2 attempt successful?  Yes   Call start time  1701   Call end time  1711   Discharge diagnosis  Acute ST elevation MI involving the inferior, lateral and posterior walls.   Is patient permission given to speak with other caregiver?  No   Meds reviewed with patient/caregiver?  Yes   Is the patient having any side effects they believe may be caused by any medication additions or changes?  Yes   Side effects comments   Patient reports that she feels wiped out from metoprolol. She reports that dose has been adjusted.    Does the patient have all prescriptions related to this admission filled (includes statins,anticoagulants,HTN meds,anti-arrhythmia meds)  Yes   Is the patient taking all medications as directed (includes completed medication regime)?  Yes   Does the patient have a primary care provider?   Yes   Comments regarding PCP  Patient has had PCP f/u since discharge. Has a phone f/u scheduled.    Has the patient kept scheduled appointments due by today?  Yes   Comments  Patient reports that she has had phone contact with cardiology nurse.    Has home health visited the patient within 72 hours of discharge?  N/A   Psychosocial issues?  No   Did the patient receive a copy of their discharge instructions?  Yes   Nursing interventions  Reviewed instructions with patient   What is the patient's perception of their health status since discharge?  Improving   Nursing interventions  Nurse provided patient education   Is the patient/caregiver able to teach back signs and symptoms of when to call for help immediately:  Sudden discomfort in arms, back, neck or jaw, Sudden chest discomfort, Shortness of breath at any time, Dizziness or lightheadedness, Irregular or rapid heart rate, Sudden sweating or clammy  skin, Nausea or vomiting   Is the patient/caregiver able to teach back ways to prevent a second heart attack:  Take medications, Follow up with MD   Is the patient/caregiver able to teach back the hierarchy of who to call/visit for symptoms/problems? PCP, Specialist, Home health nurse, Urgent Care, ED, 911  Yes   Week 2 call completed?  Yes          Desirae Carreon RN

## 2020-04-10 ENCOUNTER — DOCUMENTATION (OUTPATIENT)
Dept: CARDIAC REHAB | Facility: HOSPITAL | Age: 45
End: 2020-04-10

## 2020-04-13 ENCOUNTER — READMISSION MANAGEMENT (OUTPATIENT)
Dept: CALL CENTER | Facility: HOSPITAL | Age: 45
End: 2020-04-13

## 2020-04-13 NOTE — OUTREACH NOTE
AMI Week 3 Survey      Responses   [facility] patient discharged from?  [place]   Does the patient have one of the following disease processes/diagnoses(primary or secondary)?  Acute MI (STEMI,NSTEMI)   Week 3 attempt successful?  No   Unsuccessful attempts  Attempt 1          Yanira Brumfield RN

## 2020-04-14 ENCOUNTER — READMISSION MANAGEMENT (OUTPATIENT)
Dept: CALL CENTER | Facility: HOSPITAL | Age: 45
End: 2020-04-14

## 2020-04-14 NOTE — OUTREACH NOTE
AMI Week 3 Survey      Responses   [facility] patient discharged from?  [place]   Does the patient have one of the following disease processes/diagnoses(primary or secondary)?  Acute MI (STEMI,NSTEMI)   Week 3 attempt successful?  Yes   Call start time  1650   Call end time  1653   Discharge diagnosis  Acute ST elevation MI involving the inferior, lateral and posterior walls.   Meds reviewed with patient/caregiver?  Yes   Is the patient having any side effects they believe may be caused by any medication additions or changes?  No   Does the patient have all prescriptions related to this admission filled (includes statins,anticoagulants,HTN meds,anti-arrhythmia meds)  Yes   Is the patient taking all medications as directed (includes completed medication regime)?  Yes   Does the patient have a primary care provider?   Yes   Has the patient kept scheduled appointments due by today?  Yes   Has home health visited the patient within 72 hours of discharge?  N/A   Psychosocial issues?  No   Did the patient receive a copy of their discharge instructions?  Yes   Nursing interventions  Reviewed instructions with patient   What is the patient's perception of their health status since discharge?  Improving   Nursing interventions  Nurse provided patient education   Is the patient/caregiver able to teach back signs and symptoms of when to call for help immediately:  Sudden chest discomfort, Shortness of breath at any time, Sudden sweating or clammy skin, Sudden discomfort in arms, back, neck or jaw, Nausea or vomiting   Nursing interventions  Nurse provided patient education   Is the pateint /caregiver able to teach back the importance of cardiac rehab?  Yes   Nursing interventions  Provided education on importance of cardiac rehab   Is the patient/caregiver able to teach back lifestyle changes to help prevent MIs  Heart healthy diet, Maintaining a healthy weight, Regular exercise as approved by provider   Is the  patient/caregiver able to teach back ways to prevent a second heart attack:  Take medications, Follow up with MD   Is the patient/caregiver able to teach back the hierarchy of who to call/visit for symptoms/problems? PCP, Specialist, Home health nurse, Urgent Care, ED, 911  Yes   Additional teach back comments  Cleans all day so counts as exercise she says.   Week 3 call completed?  Yes   Revoked  No further contact(revokes)-requires comment          Christina Ponce RN

## 2020-06-12 ENCOUNTER — OFFICE VISIT (OUTPATIENT)
Dept: CARDIOLOGY | Facility: CLINIC | Age: 45
End: 2020-06-12

## 2020-06-12 VITALS
OXYGEN SATURATION: 98 % | SYSTOLIC BLOOD PRESSURE: 140 MMHG | HEART RATE: 85 BPM | BODY MASS INDEX: 29.04 KG/M2 | DIASTOLIC BLOOD PRESSURE: 92 MMHG | WEIGHT: 191.6 LBS | HEIGHT: 68 IN

## 2020-06-12 DIAGNOSIS — I25.42 SPONTANEOUS DISSECTION OF CORONARY ARTERY: Primary | ICD-10-CM

## 2020-06-12 DIAGNOSIS — I10 ESSENTIAL HYPERTENSION: ICD-10-CM

## 2020-06-12 DIAGNOSIS — E78.2 MIXED HYPERLIPIDEMIA: ICD-10-CM

## 2020-06-12 PROCEDURE — 99214 OFFICE O/P EST MOD 30 MIN: CPT | Performed by: INTERNAL MEDICINE

## 2020-06-12 NOTE — PROGRESS NOTES
[facility]    Encounter Date: 2020    Patient ID: Karen Casper is a  45 y.o. female.  : 1975     PCP: Mar Sims APRN       Chief Complaint: Spontaneous coronary artery dissection (hospital f/up )      PROBLEM LIST:  1. Spontaneous coronary artery dissection:  a. ED presentation   b. C for STEMI due to SCAD, 2020: SCAD of the small terminal lateral apical branch of the obtuse marginal. Tortuous major vessels and smaller branches suggesting presence of fibromuscular dysplasia. EF 60%.  c. Echo, 2020: EF 60%. Trace MR and TR.  2. Hypertension.  3. Dyslipidemia.  4. Anxiety/depression.  5. Seasonal allergies.    History of Present Illness  Patient presents today for a hospital follow-up with a history of STEMI due to SCAD. Since discharge, she has been feeling well overall from a cardiovascular standpoint. She has been taking metoprolol 25 mg in the morning, and 12.5 mg in the evening. She has been trying to manage her stress, but admits she has been unsuccessful. Patient also reports some fatigue and shortness of breath, but denies chest pain palpitations, edema, dizziness, and syncope. Patient also complains of easy bruising on ASA and Plavix.    No Known Allergies      Current Outpatient Medications:   •  acetaminophen (TYLENOL) 500 MG tablet, Take 500 mg by mouth Every 6 (Six) Hours As Needed for Mild Pain ., Disp: , Rfl:   •  albuterol sulfate  (90 Base) MCG/ACT inhaler, Inhale 2 puffs Every 4 (Four) Hours As Needed for Wheezing., Disp: , Rfl:   •  aspirin 81 MG chewable tablet, Chew 1 tablet Daily., Disp: 100 tablet, Rfl: 3  •  atorvastatin (LIPITOR) 40 MG tablet, Take 1 tablet by mouth Every Night., Disp: 90 tablet, Rfl: 3  •  busPIRone (BUSPAR) 15 MG tablet, Take 15 mg by mouth 2 (Two) Times a Day As Needed (Anxiety)., Disp: , Rfl:   •  Cholecalciferol (VITAMIN D3) 1.25 MG (84370 UT) capsule, Take 1,000 Units by  mouth 1 (One) Time Per Week., Disp: , Rfl:   •  clopidogrel (PLAVIX) 75 MG tablet, Take 1 tablet by mouth Daily., Disp: 90 tablet, Rfl: 3  •  cyclobenzaprine (FLEXERIL) 5 MG tablet, Take 5 mg by mouth See Admin Instructions. Take 1 tablet by mouth twice daily as needed, and take 2 tablets by mouth at bedtime, Disp: , Rfl:   •  fluticasone (FLONASE) 50 MCG/ACT nasal spray, 2 sprays into the nostril(s) as directed by provider Daily., Disp: , Rfl:   •  Fluticasone Furoate-Vilanterol (Breo Ellipta) 100-25 MCG/INH inhaler, Inhale 1 puff Daily., Disp: , Rfl:   •  levocetirizine (XYZAL) 5 MG tablet, Take 10 mg by mouth every night at bedtime., Disp: , Rfl:   •  metoprolol tartrate (LOPRESSOR) 50 MG tablet, Take 1 tablet by mouth Every 12 (Twelve) Hours. (Patient taking differently: Take 50 mg by mouth Every 12 (Twelve) Hours. 25mg in the am and 12.5mg at bedtime), Disp: 180 tablet, Rfl: 3  •  montelukast (SINGULAIR) 10 MG tablet, Take 10 mg by mouth Every Night., Disp: , Rfl:   •  Multiple Vitamins-Minerals (MULTIVITAMIN ADULT PO), Take 1 tablet by mouth Daily., Disp: , Rfl:   •  nitroglycerin (NITROSTAT) 0.4 MG SL tablet, Place 1 tablet under the tongue as needed for chest pain. May repeat every 5 mins for up three doses., Disp: 25 tablet, Rfl: 1  •  pantoprazole (PROTONIX) 40 MG EC tablet, Take 40 mg by mouth Daily., Disp: , Rfl:   •  Probiotic Product (PROBIOTIC PO), Take 1 tablet by mouth Daily., Disp: , Rfl:   •  sertraline (ZOLOFT) 100 MG tablet, Take 100 mg by mouth 2 (Two) Times a Day., Disp: , Rfl:   •  traZODone (DESYREL) 100 MG tablet, Take 100 mg by mouth Every Night., Disp: , Rfl:     The following portions of the patient's history were reviewed and updated as appropriate: allergies, current medications, past family history, past medical history, past social history, past surgical history and problem list.    ROS  Review of Systems   Constitution: Negative for chills, fever, generalized weakness. Positive for  "fatigue  Cardiovascular: Negative for chest pain, claudication, dyspnea on exertion, leg swelling, palpitations, orthopnea, and syncope.   Respiratory: Negative for cough and wheezing. Positive for shortness of breath  Heme/Lymph: Positive for easy bruising.  HENT: Negative for ear pain, nosebleeds, and tinnitus.  Gastrointestinal: Negative for abdominal pain, constipation, diarrhea, nausea and vomiting.   Genitourinary: No urinary symptoms.  Musculoskeletal: Negative for muscle cramps.  Neurological: Negative for dizziness, headaches, loss of balance, numbness, and symptoms of stroke.  Psychiatric: Positive for anxiety    All other systems reviewed and are negative.        Objective:     /92 (BP Location: Right arm, Patient Position: Sitting)   Pulse 85   Ht 172.7 cm (68\")   Wt 86.9 kg (191 lb 9.6 oz)   SpO2 98%   BMI 29.13 kg/m²      Physical Exam  Constitutional: Patient appears well-developed and well-nourished.   HENT: HEENT exam unremarkable.   Neck: Neck supple. No JVD present. No carotid bruits.   Cardiovascular: Normal rate, regular rhythm and normal heart sounds. No murmur heard.   2+ symmetric pulses.   Pulmonary/Chest: Breath sounds normal. Does not exhibit tenderness.   Abdominal: Abdomen benign.   Musculoskeletal: Does not exhibit edema.   Neurological: Neurological exam unremarkable.   Vitals reviewed.    Lab Review:   Lab Results   Component Value Date    GLUCOSE 112 (H) 03/26/2020    BUN 11 03/26/2020    CREATININE 0.59 03/26/2020    EGFRIFNONA 110 03/26/2020    BCR 18.6 03/26/2020    K 3.8 03/26/2020    CO2 21.0 (L) 03/26/2020    CALCIUM 8.1 (L) 03/26/2020     Lab Results   Component Value Date    CHOL 233 (H) 03/25/2020    TRIG 81 03/25/2020    HDL 71 (H) 03/25/2020     (H) 03/25/2020      Lab Results   Component Value Date    WBC 6.97 03/26/2020    RBC 3.69 (L) 03/26/2020    HGB 10.8 (L) 03/26/2020    HCT 34.3 03/26/2020    MCV 93.0 03/26/2020     03/26/2020     Lab " Results   Component Value Date    HGBA1C 5.50 03/25/2020        Procedures       Assessment:      Diagnosis Plan   1. Spontaneous dissection of coronary artery  Patient may discontinue Plavix given her easy bruising and no significant coronary stenosis on cardiac catheterization. Remain on ASA 81 mg daily. Increase metoprolol tartrate to 25 mg BID.   2. Essential hypertension  Increase metoprolol tartrate to 25 mg BID.   3. Mixed hyperlipidemia  Continue atorvastatin 40 mg.      Plan:   Patient may discontinue Plavix given her easy bruising and no significant coronary stenosis on cardiac catheterization. Remain on ASA 81 mg daily.  Increase metoprolol to 25 mg BID. Continue all other current medications.   Begin routine aerobic exercise, at least 30 minutes 4 days per week.   Patient encouraged to seek ways to decrease her anxiety.  FU in 6 MO, sooner as needed.  Thank you for allowing us to participate in the care of your patient.     Scribed for Adalberto Rand MD by Ellie Pastor. 6/12/2020  10:44      I, Adalberto Rand MD, personally performed the services described in this documentation as scribed by the above named individual in my presence, and it is both accurate and complete.  6/12/2020  11:44        Please note that portions of this note may have been completed with a voice recognition program. Efforts were made to edit the dictations, but occasionally words are mistranscribed.

## 2020-06-15 ENCOUNTER — OFFICE VISIT (OUTPATIENT)
Dept: NEUROSURGERY | Facility: CLINIC | Age: 45
End: 2020-06-15

## 2020-06-15 VITALS
WEIGHT: 191 LBS | DIASTOLIC BLOOD PRESSURE: 68 MMHG | BODY MASS INDEX: 28.95 KG/M2 | HEIGHT: 68 IN | TEMPERATURE: 97.8 F | SYSTOLIC BLOOD PRESSURE: 112 MMHG

## 2020-06-15 DIAGNOSIS — M51.26 HNP (HERNIATED NUCLEUS PULPOSUS), LUMBAR: Primary | ICD-10-CM

## 2020-06-15 DIAGNOSIS — M51.36 DDD (DEGENERATIVE DISC DISEASE), LUMBAR: ICD-10-CM

## 2020-06-15 PROCEDURE — 99203 OFFICE O/P NEW LOW 30 MIN: CPT | Performed by: NEUROLOGICAL SURGERY

## 2020-06-15 RX ORDER — CYCLOBENZAPRINE HCL 5 MG
5 TABLET ORAL SEE ADMIN INSTRUCTIONS
Qty: 90 TABLET | Refills: 0 | Status: SHIPPED | OUTPATIENT
Start: 2020-06-15 | End: 2020-07-20

## 2020-06-15 RX ORDER — NABUMETONE 750 MG/1
750 TABLET, FILM COATED ORAL 2 TIMES DAILY
Qty: 60 TABLET | Refills: 0 | Status: SHIPPED | OUTPATIENT
Start: 2020-06-15 | End: 2020-07-20

## 2020-06-15 NOTE — PATIENT INSTRUCTIONS
In 1 month after trying physical therapy:    Call Dr. Alex on a Monday or Tuesday and leave a message.     He will call you back at the end of the day as soon as he can.     [phone] [facility]   [phone] - Jackie  [phone] - Gloria

## 2020-06-15 NOTE — PROGRESS NOTES
Karen Reynosoagustin  1975  [id number]      Chief Complaint   Patient presents with   • Back Pain   • Numbness       HISTORY OF PRESENT ILLNESS: This is a 45-year-old  who has a long history of back pain which got markedly worse in February which time she fell.  Since that time she has had pain in her back which radiates down the medial aspect of her thigh on both sides.  It does not radiate distal to the knee.  It is more of a numbness than an absolute pain.  Picking up boxes, walking or standing in 1 place tend to make matters worse.  She has not been to physical therapy.  Lumbar MRI was performed she referred for neurosurgical consultation.    Past Medical History:   Diagnosis Date   • Arthritis    • Asthma    • Hypertension        Past Surgical History:   Procedure Laterality Date   • CARDIAC CATHETERIZATION N/A 3/25/2020    Procedure: Left Heart Cath;  Surgeon: Adalberto Rand MD;  Location: [facility] CATH INVASIVE LOCATION;  Service: Cardiology;  Laterality: N/A;   •  SECTION     • WISDOM TOOTH EXTRACTION         Family History   Problem Relation Age of Onset   • Heart disease Maternal Grandmother    • Stroke Mother    • Hypertension Mother    • Heart attack Father        Social History     Socioeconomic History   • Marital status:      Spouse name: Not on file   • Number of children: Not on file   • Years of education: Not on file   • Highest education level: Not on file   Tobacco Use   • Smoking status: Never Smoker   • Smokeless tobacco: Never Used   Substance and Sexual Activity   • Alcohol use: Not Currently     Frequency: Never     Comment: 8 years ago    • Drug use: Never   • Sexual activity: Defer       No Known Allergies      Current Outpatient Medications:   •  acetaminophen (TYLENOL) 500 MG tablet, Take 500 mg by mouth Every 6 (Six) Hours As Needed for Mild Pain ., Disp: , Rfl:   •  albuterol sulfate  (90 Base) MCG/ACT inhaler, Inhale 2 puffs Every 4 (Four) Hours As  Needed for Wheezing., Disp: , Rfl:   •  aspirin 81 MG chewable tablet, Chew 1 tablet Daily., Disp: 100 tablet, Rfl: 3  •  atorvastatin (LIPITOR) 40 MG tablet, Take 1 tablet by mouth Every Night., Disp: 90 tablet, Rfl: 3  •  busPIRone (BUSPAR) 15 MG tablet, Take 15 mg by mouth 2 (Two) Times a Day As Needed (Anxiety)., Disp: , Rfl:   •  Cholecalciferol (VITAMIN D3) 1.25 MG (86858 UT) capsule, Take 1,000 Units by mouth 1 (One) Time Per Week., Disp: , Rfl:   •  cyclobenzaprine (FLEXERIL) 5 MG tablet, Take 5 mg by mouth See Admin Instructions. Take 1 tablet by mouth twice daily as needed, and take 2 tablets by mouth at bedtime, Disp: , Rfl:   •  fluticasone (FLONASE) 50 MCG/ACT nasal spray, 2 sprays into the nostril(s) as directed by provider Daily., Disp: , Rfl:   •  Fluticasone Furoate-Vilanterol (Breo Ellipta) 100-25 MCG/INH inhaler, Inhale 1 puff Daily., Disp: , Rfl:   •  levocetirizine (XYZAL) 5 MG tablet, Take 10 mg by mouth every night at bedtime., Disp: , Rfl:   •  metoprolol tartrate (LOPRESSOR) 50 MG tablet, Take 1 tablet by mouth Every 12 (Twelve) Hours. (Patient taking differently: Take 50 mg by mouth Every 12 (Twelve) Hours. 25mg in the am and 12.5mg at bedtime), Disp: 180 tablet, Rfl: 3  •  montelukast (SINGULAIR) 10 MG tablet, Take 10 mg by mouth Every Night., Disp: , Rfl:   •  Multiple Vitamins-Minerals (MULTIVITAMIN ADULT PO), Take 1 tablet by mouth Daily., Disp: , Rfl:   •  nitroglycerin (NITROSTAT) 0.4 MG SL tablet, Place 1 tablet under the tongue as needed for chest pain. May repeat every 5 mins for up three doses., Disp: 25 tablet, Rfl: 1  •  pantoprazole (PROTONIX) 40 MG EC tablet, Take 40 mg by mouth Daily., Disp: , Rfl:   •  Probiotic Product (PROBIOTIC PO), Take 1 tablet by mouth Daily., Disp: , Rfl:   •  sertraline (ZOLOFT) 100 MG tablet, Take 100 mg by mouth 2 (Two) Times a Day., Disp: , Rfl:   •  traZODone (DESYREL) 100 MG tablet, Take 100 mg by mouth Every Night., Disp: , Rfl:   •   clopidogrel (PLAVIX) 75 MG tablet, Take 1 tablet by mouth Daily., Disp: 90 tablet, Rfl: 3    Review of Systems   Constitutional: Negative for activity change, appetite change, chills, diaphoresis, fatigue, fever and unexpected weight change.   HENT: Positive for ear pain and sinus pressure. Negative for congestion, dental problem, drooling, ear discharge, facial swelling, hearing loss, mouth sores, nosebleeds, postnasal drip, rhinorrhea, sneezing, sore throat, tinnitus, trouble swallowing and voice change.    Eyes: Negative for photophobia, pain, discharge, redness, itching and visual disturbance.   Respiratory: Positive for cough and choking. Negative for apnea, chest tightness, shortness of breath, wheezing and stridor.    Cardiovascular: Negative for chest pain, palpitations and leg swelling.   Gastrointestinal: Negative for abdominal distention, abdominal pain, anal bleeding, blood in stool, constipation, diarrhea, nausea, rectal pain and vomiting.   Endocrine: Negative for cold intolerance, heat intolerance, polydipsia, polyphagia and polyuria.   Genitourinary: Negative for decreased urine volume, difficulty urinating, dysuria, enuresis, flank pain, frequency, genital sores, hematuria and urgency.   Musculoskeletal: Positive for back pain and neck pain. Negative for arthralgias, gait problem, joint swelling, myalgias and neck stiffness.   Skin: Negative for color change, pallor, rash and wound.   Allergic/Immunologic: Positive for environmental allergies and food allergies. Negative for immunocompromised state.   Neurological: Positive for dizziness, light-headedness and headaches. Negative for tremors, seizures, syncope, facial asymmetry, speech difficulty, weakness and numbness.   Hematological: Negative for adenopathy. Does not bruise/bleed easily.   Psychiatric/Behavioral: Positive for dysphoric mood and sleep disturbance. Negative for agitation, behavioral problems, confusion, decreased concentration,  "hallucinations, self-injury and suicidal ideas. The patient is nervous/anxious. The patient is not hyperactive.        Vitals:    06/15/20 0904   BP: 112/68   BP Location: Right arm   Patient Position: Sitting   Temp: 97.8 °F (36.6 °C)   TempSrc: Temporal   Weight: 86.6 kg (191 lb)   Height: 172.7 cm (68\")       Neurological Examination:    Mental status/speech: The patient is alert and oriented.  Speech is clear without aphysia or dysarthria.  No overt cognitive deficits.    Cranial nerve examination:    Olfaction: Smell is intact.  Vision: Vision is intact without visual field abnormalities.  Funduscopic examination is normal.  No pupillary irregularity.  Ocular motor examination: The extraocular muscles are intact.  There is no diplopia.  The pupil is round and reactive to both light and accommodation.  There is no nystagmus.  Facial movement/sensation: There is no facial weakness.  Sensation is intact in the first, second, and third divisions of the trigeminal nerve.  The corneal reflex is intact.  Auditory: Hearing is intact to finger rub bilaterally.  Cranial nerves IX, X, XI, XII: Phonation is normal.  No dysphagia.  Tongue is protruded in the midline without atrophy.  The gag reflex is intact.  Shoulder shrug is normal.    Musculoligamentous ligamentous examination: She has limited range of motion lumbar spine.  Straight leg raising, Lasègue and flip test negative.  Her strength is intact without sensory loss.  All reflexes are easily elicitable with the notable exception of the left Achilles reflex which is absent.  Her gait is normal.  No Babinski, Keyla or clonus    Medical Decision Making:     Diagnostic Data Set: Lumbar MRI data set shows degenerative disc disease L4-5 and L5-S1.  L5-S1 she has a protrusion of the disc toward the left.      Assessment: Symptomatic disc protrusion L5-S1, left; degenerative disc disease L4-5; L5-S1          Recommendations: I would treat this conservatively.  I do not " think that surgery is absolute necessity at this time.  I have sent her to physical therapy; provided a prescription of Relafen 750 mg twice daily and renewed the Flexeril 5 mg 4 times daily.  She will call me in 1 month.  I would hope after a month of physical therapy her symptoms will be improved.  However, should she develop pain in her left lower extremity in the vicinity of the first sacral nerve root (which she does not have at this time) I would be more aggressive in the context of disc excision.  Hopefully, that can be avoided.  I will keep you informed.  Thank you for allow me to see her.        I greatly appreciate the opportunity to see and evaluate this individual.  If you have questions or concerns regarding issues that I may have overlooked please call me at any time: [phone].  Baldev Alex M.D.  [facility]  [address] [place] [address]      Scribed for Kwaku Alex MD by Gloria Balderas CMA. 6/15/2020 09:25

## 2020-07-20 ENCOUNTER — TELEPHONE (OUTPATIENT)
Dept: NEUROSURGERY | Facility: CLINIC | Age: 45
End: 2020-07-20

## 2020-07-20 RX ORDER — NABUMETONE 750 MG/1
TABLET, FILM COATED ORAL
Qty: 60 TABLET | Refills: 0 | Status: SHIPPED | OUTPATIENT
Start: 2020-07-20

## 2020-07-20 RX ORDER — CYCLOBENZAPRINE HCL 5 MG
TABLET ORAL
Qty: 90 TABLET | Refills: 0 | Status: SHIPPED | OUTPATIENT
Start: 2020-07-20

## 2020-07-20 NOTE — TELEPHONE ENCOUNTER
Provider:  Isai  Caller:  Automated refill request  Surgery:  Na  Surgery Date:    Last visit:  06/15/20  Next visit: NA    Reason for call:         Requested Prescriptions     Pending Prescriptions Disp Refills   • nabumetone (RELAFEN) 750 MG tablet [Pharmacy Med Name: NABUMETONE 750 MG TABLET] 60 tablet 0     Sig: TAKE 1 TABLET TWICE DAILY.   • cyclobenzaprine (FLEXERIL) 5 MG tablet [Pharmacy Med Name: CYCLOBENZAPRINE 5 MG TABLET] 90 tablet 0     Sig: TAKE 1 TABLET TWICE DAILY AS NEEDED AND 2 TABLETS AT BEDTIME.

## 2020-07-20 NOTE — TELEPHONE ENCOUNTER
Dr Alex did not recommend surgery given her exam and current symptoms. He would like her to continue physical therapy for now. Have her continue for another 4-6 weeks and call with an update.

## 2020-07-20 NOTE — TELEPHONE ENCOUNTER
Pt left a message stating Dr. Alex wanted her to call in 1 month with an update.     Pt states therapy and medicine is helping some, still having low back and buttock pain.

## (undated) DEVICE — ANGIO-SEAL EVOLUTION VASCULAR CLOSURE DEVICE: Brand: ANGIO-SEAL

## (undated) DEVICE — PINNACLE INTRODUCER SHEATH: Brand: PINNACLE

## (undated) DEVICE — PK CATH CARD 10

## (undated) DEVICE — SKIN PREP TRAY W/CHG: Brand: MEDLINE INDUSTRIES, INC.

## (undated) DEVICE — GUIDE CATHETER: Brand: MACH1™

## (undated) DEVICE — CATH DIAG EXPO M/ PK 6FR FL4/FR4 PIG 3PK

## (undated) DEVICE — GW LUGE .014 182 CM

## (undated) DEVICE — GW J TP FIX CORE .035 150